# Patient Record
Sex: MALE | Race: WHITE | NOT HISPANIC OR LATINO | URBAN - METROPOLITAN AREA
[De-identification: names, ages, dates, MRNs, and addresses within clinical notes are randomized per-mention and may not be internally consistent; named-entity substitution may affect disease eponyms.]

---

## 2018-08-11 ENCOUNTER — INPATIENT (INPATIENT)
Facility: HOSPITAL | Age: 63
LOS: 2 days | Discharge: HOME | DRG: 854 | End: 2018-08-14
Attending: SURGERY | Admitting: SURGERY
Payer: COMMERCIAL

## 2018-08-11 VITALS
SYSTOLIC BLOOD PRESSURE: 142 MMHG | DIASTOLIC BLOOD PRESSURE: 78 MMHG | RESPIRATION RATE: 18 BRPM | HEART RATE: 109 BPM | OXYGEN SATURATION: 98 % | TEMPERATURE: 99 F

## 2018-08-11 RX ORDER — LIDOCAINE 4 G/100G
1 CREAM TOPICAL ONCE
Qty: 0 | Refills: 0 | Status: COMPLETED | OUTPATIENT
Start: 2018-08-11 | End: 2018-08-12

## 2018-08-11 RX ORDER — SODIUM CHLORIDE 9 MG/ML
1000 INJECTION INTRAMUSCULAR; INTRAVENOUS; SUBCUTANEOUS ONCE
Qty: 0 | Refills: 0 | Status: COMPLETED | OUTPATIENT
Start: 2018-08-11 | End: 2018-08-11

## 2018-08-11 RX ORDER — KETOROLAC TROMETHAMINE 30 MG/ML
15 SYRINGE (ML) INJECTION ONCE
Qty: 0 | Refills: 0 | Status: DISCONTINUED | OUTPATIENT
Start: 2018-08-11 | End: 2018-08-11

## 2018-08-11 NOTE — ED PROVIDER NOTE - CARE PLAN
Principal Discharge DX:	Sepsis  Secondary Diagnosis:	Perianal abscess  Secondary Diagnosis:	Hypokalemia

## 2018-08-11 NOTE — ED PROVIDER NOTE - MEDICAL DECISION MAKING DETAILS
Perianal abscess concern on exam, subsequently with temp 100.3. Of note, met sepsis criteria though not given immediate IV abx due to non-definitive etiology on exam. No thrombosed hemorrhoid. Abdomen benign. Hemodynamically stable. Given Toradol and fluids following which is now comfortable with appearing. Given vanc. D/w surgery who admitted for further w/u and care, especially as pt is on Plavix.

## 2018-08-11 NOTE — ED PROVIDER NOTE - PHYSICAL EXAMINATION
Afebrile, hemodynamically stable, saturating well  Appears uncomfortable  Head NCAT  EOMI grossly, anicteric  MM dry  No JVD  RRR, nml S1/S2, no m/r/g  Lungs CTAB, no w/r/r  Abd soft, NT, ND, nml BS, no rebound or guarding   (below scribe as chaperone): Testicles nontender, no change in color, lie, or elevation  Rectal (scribe chaperone): no visible hemorrhoids. Noted L perianal induration without fluctuance and mild erythema  AAO, CN's 3-12 grossly intact  CLEMENTE spontaneously, no leg cyanosis or edema  Skin warm, dry, no rashes or hives

## 2018-08-11 NOTE — ED PROVIDER NOTE - PMH
BPH (benign prostatic hyperplasia)    Enlarged prostate    HTN (hypertension)    MI (Myocardial Infarction)    Stented Coronary Artery

## 2018-08-11 NOTE — ED PROVIDER NOTE - OBJECTIVE STATEMENT
63 y/o M with a PMHx of MI, BPH, HTN and PSHx of stented coronary artery presents to the ED c/o fever (100) x today urinary retention, abd pain, severe generalized pain and swelling near rectum and overall genitalia. With pain radiating to leg. Pt states he ate raw seafood (which he rarely does) 3 weeks ago, which brought the onset of his symptoms, which have worsened since then. Pt had stomach pain and violent diarrhea that was intermittent and changing in severity x 2 days after eating. Since then pt has gone to bathroom frequency with loose stool. Pt also broke out in hives. Pt denies bloody stool, chest pain, loss of appetite, vomiting, shortness of breath or any other complaint. Pt is currently taking these medications:   atorvastatin (40 mg), Alfuzosin (10), Enalaprin (5mg), clopidogrel (75mg), metprezel (50mg). NKDA. 63 y/o M with a PMHx of MI, BPH, HTN and PSHx of stented coronary artery presents to the ED c/o fever (Tmax 100) x today as well as urinary frequency. Pt states he ate raw seafood (which he rarely does) 3 weeks ago, which brought the onset of his symptoms and which initially resolved intermittently then have worsened since then. Pt had stomach pain and violent diarrhea that was intermittent and changing in severity x 2 days after eating, now with mild b/l suprapubic cramping. Associated mild b/l testicular soreness as well as very painful hemorrhoids. Pt denies bloody stool, chest pain, loss of appetite, vomiting, shortness of breath or any other complaint. Pt is currently taking these medications: atorvastatin (40 mg), Alfuzosin, Enalapril, clopidogrel (75mg). NKDA.

## 2018-08-12 ENCOUNTER — TRANSCRIPTION ENCOUNTER (OUTPATIENT)
Age: 63
End: 2018-08-12

## 2018-08-12 DIAGNOSIS — A41.9 SEPSIS, UNSPECIFIED ORGANISM: ICD-10-CM

## 2018-08-12 DIAGNOSIS — K61.0 ANAL ABSCESS: ICD-10-CM

## 2018-08-12 LAB
ALBUMIN SERPL ELPH-MCNC: 1.5 G/DL — LOW (ref 3.5–5)
ALBUMIN SERPL ELPH-MCNC: 3 G/DL — LOW (ref 3.5–5)
ALP SERPL-CCNC: 27 U/L — LOW (ref 40–120)
ALP SERPL-CCNC: 58 U/L — SIGNIFICANT CHANGE UP (ref 40–120)
ALT FLD-CCNC: 18 U/L DA — SIGNIFICANT CHANGE UP (ref 10–60)
ALT FLD-CCNC: 35 U/L DA — SIGNIFICANT CHANGE UP (ref 10–60)
ANION GAP SERPL CALC-SCNC: 10 MMOL/L — SIGNIFICANT CHANGE UP (ref 5–17)
ANION GAP SERPL CALC-SCNC: 10 MMOL/L — SIGNIFICANT CHANGE UP (ref 5–17)
APPEARANCE UR: CLEAR — SIGNIFICANT CHANGE UP
AST SERPL-CCNC: 12 U/L — SIGNIFICANT CHANGE UP (ref 10–40)
AST SERPL-CCNC: 15 U/L — SIGNIFICANT CHANGE UP (ref 10–40)
BASOPHILS # BLD AUTO: 0.1 K/UL — SIGNIFICANT CHANGE UP (ref 0–0.2)
BASOPHILS # BLD AUTO: 0.1 K/UL — SIGNIFICANT CHANGE UP (ref 0–0.2)
BASOPHILS NFR BLD AUTO: 0.4 % — SIGNIFICANT CHANGE UP (ref 0–2)
BASOPHILS NFR BLD AUTO: 0.6 % — SIGNIFICANT CHANGE UP (ref 0–2)
BILIRUB SERPL-MCNC: 0.3 MG/DL — SIGNIFICANT CHANGE UP (ref 0.2–1.2)
BILIRUB SERPL-MCNC: 0.6 MG/DL — SIGNIFICANT CHANGE UP (ref 0.2–1.2)
BILIRUB UR-MCNC: NEGATIVE — SIGNIFICANT CHANGE UP
BUN SERPL-MCNC: 13 MG/DL — SIGNIFICANT CHANGE UP (ref 7–18)
BUN SERPL-MCNC: 8 MG/DL — SIGNIFICANT CHANGE UP (ref 7–18)
CALCIUM SERPL-MCNC: 8.3 MG/DL — LOW (ref 8.4–10.5)
CALCIUM SERPL-MCNC: <5 MG/DL — CRITICAL LOW (ref 8.4–10.5)
CHLORIDE SERPL-SCNC: 107 MMOL/L — SIGNIFICANT CHANGE UP (ref 96–108)
CHLORIDE SERPL-SCNC: 126 MMOL/L — HIGH (ref 96–108)
CO2 SERPL-SCNC: 15 MMOL/L — LOW (ref 22–31)
CO2 SERPL-SCNC: 24 MMOL/L — SIGNIFICANT CHANGE UP (ref 22–31)
COLOR SPEC: YELLOW — SIGNIFICANT CHANGE UP
CREAT SERPL-MCNC: 0.38 MG/DL — LOW (ref 0.5–1.3)
CREAT SERPL-MCNC: 1.02 MG/DL — SIGNIFICANT CHANGE UP (ref 0.5–1.3)
DIFF PNL FLD: ABNORMAL
EOSINOPHIL # BLD AUTO: 0 K/UL — SIGNIFICANT CHANGE UP (ref 0–0.5)
EOSINOPHIL # BLD AUTO: 0 K/UL — SIGNIFICANT CHANGE UP (ref 0–0.5)
EOSINOPHIL NFR BLD AUTO: 0 % — SIGNIFICANT CHANGE UP (ref 0–6)
EOSINOPHIL NFR BLD AUTO: 0 % — SIGNIFICANT CHANGE UP (ref 0–6)
GLUCOSE SERPL-MCNC: 134 MG/DL — HIGH (ref 70–99)
GLUCOSE SERPL-MCNC: 75 MG/DL — SIGNIFICANT CHANGE UP (ref 70–99)
GLUCOSE UR QL: NEGATIVE — SIGNIFICANT CHANGE UP
HCT VFR BLD CALC: 33.5 % — LOW (ref 39–50)
HCT VFR BLD CALC: 34.2 % — LOW (ref 39–50)
HGB BLD-MCNC: 11.6 G/DL — LOW (ref 13–17)
HGB BLD-MCNC: 11.7 G/DL — LOW (ref 13–17)
KETONES UR-MCNC: NEGATIVE — SIGNIFICANT CHANGE UP
LACTATE SERPL-SCNC: 0.7 MMOL/L — SIGNIFICANT CHANGE UP (ref 0.7–2)
LEUKOCYTE ESTERASE UR-ACNC: NEGATIVE — SIGNIFICANT CHANGE UP
LIDOCAIN IGE QN: 47 U/L — LOW (ref 73–393)
LYMPHOCYTES # BLD AUTO: 0.7 K/UL — LOW (ref 1–3.3)
LYMPHOCYTES # BLD AUTO: 0.7 K/UL — LOW (ref 1–3.3)
LYMPHOCYTES # BLD AUTO: 5.6 % — LOW (ref 13–44)
LYMPHOCYTES # BLD AUTO: 5.6 % — LOW (ref 13–44)
MCHC RBC-ENTMCNC: 29.8 PG — SIGNIFICANT CHANGE UP (ref 27–34)
MCHC RBC-ENTMCNC: 29.8 PG — SIGNIFICANT CHANGE UP (ref 27–34)
MCHC RBC-ENTMCNC: 34.2 GM/DL — SIGNIFICANT CHANGE UP (ref 32–36)
MCHC RBC-ENTMCNC: 34.5 GM/DL — SIGNIFICANT CHANGE UP (ref 32–36)
MCV RBC AUTO: 86.4 FL — SIGNIFICANT CHANGE UP (ref 80–100)
MCV RBC AUTO: 87 FL — SIGNIFICANT CHANGE UP (ref 80–100)
MONOCYTES # BLD AUTO: 1 K/UL — HIGH (ref 0–0.9)
MONOCYTES # BLD AUTO: 1.1 K/UL — HIGH (ref 0–0.9)
MONOCYTES NFR BLD AUTO: 7.3 % — SIGNIFICANT CHANGE UP (ref 2–14)
MONOCYTES NFR BLD AUTO: 8 % — SIGNIFICANT CHANGE UP (ref 2–14)
NEUTROPHILS # BLD AUTO: 11.3 K/UL — HIGH (ref 1.8–7.4)
NEUTROPHILS # BLD AUTO: 11.4 K/UL — HIGH (ref 1.8–7.4)
NEUTROPHILS NFR BLD AUTO: 85.8 % — HIGH (ref 43–77)
NEUTROPHILS NFR BLD AUTO: 86.7 % — HIGH (ref 43–77)
NITRITE UR-MCNC: NEGATIVE — SIGNIFICANT CHANGE UP
PH UR: 5 — SIGNIFICANT CHANGE UP (ref 5–8)
PLATELET # BLD AUTO: 181 K/UL — SIGNIFICANT CHANGE UP (ref 150–400)
PLATELET # BLD AUTO: 194 K/UL — SIGNIFICANT CHANGE UP (ref 150–400)
POTASSIUM SERPL-MCNC: 1.8 MMOL/L — CRITICAL LOW (ref 3.5–5.3)
POTASSIUM SERPL-MCNC: 3.1 MMOL/L — LOW (ref 3.5–5.3)
POTASSIUM SERPL-SCNC: 1.8 MMOL/L — CRITICAL LOW (ref 3.5–5.3)
POTASSIUM SERPL-SCNC: 3.1 MMOL/L — LOW (ref 3.5–5.3)
PROT SERPL-MCNC: 3.6 G/DL — LOW (ref 6–8.3)
PROT SERPL-MCNC: 7 G/DL — SIGNIFICANT CHANGE UP (ref 6–8.3)
PROT UR-MCNC: 15
RBC # BLD: 3.88 M/UL — LOW (ref 4.2–5.8)
RBC # BLD: 3.93 M/UL — LOW (ref 4.2–5.8)
RBC # FLD: 10.8 % — SIGNIFICANT CHANGE UP (ref 10.3–14.5)
RBC # FLD: 10.8 % — SIGNIFICANT CHANGE UP (ref 10.3–14.5)
SODIUM SERPL-SCNC: 141 MMOL/L — SIGNIFICANT CHANGE UP (ref 135–145)
SODIUM SERPL-SCNC: 151 MMOL/L — HIGH (ref 135–145)
SP GR SPEC: 1.01 — SIGNIFICANT CHANGE UP (ref 1.01–1.02)
UROBILINOGEN FLD QL: 4
WBC # BLD: 13 K/UL — HIGH (ref 3.8–10.5)
WBC # BLD: 13.3 K/UL — HIGH (ref 3.8–10.5)
WBC # FLD AUTO: 13 K/UL — HIGH (ref 3.8–10.5)
WBC # FLD AUTO: 13.3 K/UL — HIGH (ref 3.8–10.5)

## 2018-08-12 PROCEDURE — 99291 CRITICAL CARE FIRST HOUR: CPT

## 2018-08-12 PROCEDURE — 74177 CT ABD & PELVIS W/CONTRAST: CPT | Mod: 26

## 2018-08-12 PROCEDURE — 99222 1ST HOSP IP/OBS MODERATE 55: CPT | Mod: AI,57

## 2018-08-12 RX ORDER — AMPICILLIN SODIUM AND SULBACTAM SODIUM 250; 125 MG/ML; MG/ML
1.5 INJECTION, POWDER, FOR SUSPENSION INTRAMUSCULAR; INTRAVENOUS EVERY 6 HOURS
Qty: 0 | Refills: 0 | Status: DISCONTINUED | OUTPATIENT
Start: 2018-08-12 | End: 2018-08-14

## 2018-08-12 RX ORDER — SODIUM CHLORIDE 9 MG/ML
1000 INJECTION INTRAMUSCULAR; INTRAVENOUS; SUBCUTANEOUS ONCE
Qty: 0 | Refills: 0 | Status: COMPLETED | OUTPATIENT
Start: 2018-08-12 | End: 2018-08-12

## 2018-08-12 RX ORDER — ATORVASTATIN CALCIUM 80 MG/1
40 TABLET, FILM COATED ORAL AT BEDTIME
Qty: 0 | Refills: 0 | Status: DISCONTINUED | OUTPATIENT
Start: 2018-08-12 | End: 2018-08-14

## 2018-08-12 RX ORDER — METOPROLOL TARTRATE 50 MG
50 TABLET ORAL DAILY
Qty: 0 | Refills: 0 | Status: DISCONTINUED | OUTPATIENT
Start: 2018-08-12 | End: 2018-08-14

## 2018-08-12 RX ORDER — VANCOMYCIN HCL 1 G
1000 VIAL (EA) INTRAVENOUS ONCE
Qty: 0 | Refills: 0 | Status: COMPLETED | OUTPATIENT
Start: 2018-08-12 | End: 2018-08-12

## 2018-08-12 RX ORDER — POTASSIUM CHLORIDE 20 MEQ
40 PACKET (EA) ORAL ONCE
Qty: 0 | Refills: 0 | Status: COMPLETED | OUTPATIENT
Start: 2018-08-12 | End: 2018-08-12

## 2018-08-12 RX ORDER — MORPHINE SULFATE 50 MG/1
2 CAPSULE, EXTENDED RELEASE ORAL EVERY 4 HOURS
Qty: 0 | Refills: 0 | Status: DISCONTINUED | OUTPATIENT
Start: 2018-08-12 | End: 2018-08-14

## 2018-08-12 RX ORDER — DEXTROSE MONOHYDRATE, SODIUM CHLORIDE, AND POTASSIUM CHLORIDE 50; .745; 4.5 G/1000ML; G/1000ML; G/1000ML
1000 INJECTION, SOLUTION INTRAVENOUS
Qty: 0 | Refills: 0 | Status: DISCONTINUED | OUTPATIENT
Start: 2018-08-12 | End: 2018-08-14

## 2018-08-12 RX ORDER — TAMSULOSIN HYDROCHLORIDE 0.4 MG/1
0.4 CAPSULE ORAL AT BEDTIME
Qty: 0 | Refills: 0 | Status: DISCONTINUED | OUTPATIENT
Start: 2018-08-12 | End: 2018-08-14

## 2018-08-12 RX ORDER — ASPIRIN/CALCIUM CARB/MAGNESIUM 324 MG
81 TABLET ORAL DAILY
Qty: 0 | Refills: 0 | Status: DISCONTINUED | OUTPATIENT
Start: 2018-08-12 | End: 2018-08-14

## 2018-08-12 RX ORDER — ENOXAPARIN SODIUM 100 MG/ML
40 INJECTION SUBCUTANEOUS DAILY
Qty: 0 | Refills: 0 | Status: DISCONTINUED | OUTPATIENT
Start: 2018-08-12 | End: 2018-08-14

## 2018-08-12 RX ORDER — DOCUSATE SODIUM 100 MG
100 CAPSULE ORAL THREE TIMES A DAY
Qty: 0 | Refills: 0 | Status: DISCONTINUED | OUTPATIENT
Start: 2018-08-12 | End: 2018-08-12

## 2018-08-12 RX ORDER — ACETAMINOPHEN 500 MG
1000 TABLET ORAL ONCE
Qty: 0 | Refills: 0 | Status: COMPLETED | OUTPATIENT
Start: 2018-08-12 | End: 2018-08-12

## 2018-08-12 RX ORDER — ONDANSETRON 8 MG/1
4 TABLET, FILM COATED ORAL EVERY 6 HOURS
Qty: 0 | Refills: 0 | Status: DISCONTINUED | OUTPATIENT
Start: 2018-08-12 | End: 2018-08-14

## 2018-08-12 RX ORDER — ACETAMINOPHEN 500 MG
975 TABLET ORAL ONCE
Qty: 0 | Refills: 0 | Status: COMPLETED | OUTPATIENT
Start: 2018-08-12 | End: 2018-08-12

## 2018-08-12 RX ADMIN — ENOXAPARIN SODIUM 40 MILLIGRAM(S): 100 INJECTION SUBCUTANEOUS at 13:33

## 2018-08-12 RX ADMIN — ATORVASTATIN CALCIUM 40 MILLIGRAM(S): 80 TABLET, FILM COATED ORAL at 23:42

## 2018-08-12 RX ADMIN — Medication 975 MILLIGRAM(S): at 01:56

## 2018-08-12 RX ADMIN — Medication 250 MILLIGRAM(S): at 06:26

## 2018-08-12 RX ADMIN — AMPICILLIN SODIUM AND SULBACTAM SODIUM 100 GRAM(S): 250; 125 INJECTION, POWDER, FOR SUSPENSION INTRAMUSCULAR; INTRAVENOUS at 13:33

## 2018-08-12 RX ADMIN — SODIUM CHLORIDE 2000 MILLILITER(S): 9 INJECTION INTRAMUSCULAR; INTRAVENOUS; SUBCUTANEOUS at 02:13

## 2018-08-12 RX ADMIN — DEXTROSE MONOHYDRATE, SODIUM CHLORIDE, AND POTASSIUM CHLORIDE 125 MILLILITER(S): 50; .745; 4.5 INJECTION, SOLUTION INTRAVENOUS at 13:33

## 2018-08-12 RX ADMIN — SODIUM CHLORIDE 2000 MILLILITER(S): 9 INJECTION INTRAMUSCULAR; INTRAVENOUS; SUBCUTANEOUS at 00:16

## 2018-08-12 RX ADMIN — AMPICILLIN SODIUM AND SULBACTAM SODIUM 100 GRAM(S): 250; 125 INJECTION, POWDER, FOR SUSPENSION INTRAMUSCULAR; INTRAVENOUS at 19:03

## 2018-08-12 RX ADMIN — Medication 50 MILLIGRAM(S): at 13:33

## 2018-08-12 RX ADMIN — Medication 40 MILLIEQUIVALENT(S): at 04:00

## 2018-08-12 RX ADMIN — AMPICILLIN SODIUM AND SULBACTAM SODIUM 100 GRAM(S): 250; 125 INJECTION, POWDER, FOR SUSPENSION INTRAMUSCULAR; INTRAVENOUS at 06:26

## 2018-08-12 RX ADMIN — Medication 81 MILLIGRAM(S): at 13:33

## 2018-08-12 RX ADMIN — TAMSULOSIN HYDROCHLORIDE 0.4 MILLIGRAM(S): 0.4 CAPSULE ORAL at 23:42

## 2018-08-12 RX ADMIN — Medication 975 MILLIGRAM(S): at 02:43

## 2018-08-12 RX ADMIN — LIDOCAINE 1 APPLICATION(S): 4 CREAM TOPICAL at 23:42

## 2018-08-12 NOTE — H&P ADULT - ASSESSMENT
61 y/o male with sig cardiac history as per HPI; here with perianal absecss 63 y/o male with significant cardiac history as per HPI; here with perianal absecss

## 2018-08-12 NOTE — H&P ADULT - NSHPPHYSICALEXAM_GEN_ALL_CORE
NAD  alert, oriented x3  S1S2  pul good air entry b/l  abd soft, NT/ND  rectal- tender perirectal 9oclock with fluctuance, no active drainge, int exam deferred  ext no c/c/e NAD  alert, oriented x3  S1S2  pul good air entry b/l  abd soft, NT/ND  rectal- tender perirectal 9oclock with fluctuance, no active drainage, int exam deferred  ext no c/c/e

## 2018-08-12 NOTE — H&P ADULT - HISTORY OF PRESENT ILLNESS
63 y/o male with h/o BPH, HTN, MI 20 yrs ago with cardiac stenting possibly x8 per pt during past 20 yrs  currently on asa/plavix/statin/bblockers  pt unsure of medication dosages, will ask wife to bring meds  pt comes with c/o rectal pain for about 8 days with some chills, no f/n/v  pt ate some seafood about 3 weeks ago with resulting diarrhea, hives    < from: CT Abdomen and Pelvis w/ IV Cont (08.12.18 @ 03:30) >  IMPRESSION: 4.0 x 2.4 cm left perianal abscess.    < end of copied text >

## 2018-08-12 NOTE — H&P ADULT - PROBLEM SELECTOR PLAN 1
npo, hydrate, pain control, iv abx, med consult/clearance, pre-op for I&D 8/13 diet today, hydrate, pain control, iv abx, med consult/clearance, pre-op for I&D 8/13

## 2018-08-12 NOTE — CONSULT NOTE ADULT - ASSESSMENT
63 y/o male with h/o BPH, HTN, and MI in 1998 (s/p cardiac stents) came in with complaints of perianal pain x 8 days with fever off and on. He is found to have a perianal abcess requiring I&D in OR.  Medicine is consulted for pre-op clearance.    1. Perianal Abscess:  Plan for OR tomorrow 8/11/18 for I&D  METS>4 (Patient is able to climb 4 flight of stairs and walk ~2 miles without SOB or fatigue, He is independent in his daily living and is able to lift heavy furniture. He works as a )  RCRI- 0.9% risk of major cardiac events  MARTINES score: 0.2% risk of Myocardial infarction or cardiac arrest intraoperatively of upto 30 days post-op  EKG- Sinus rythm with PVC, no prior EKG to compare with  No current chest pain or SOB  - Will obtain ECHO  - Patient is at MODERATE RISK for OR    2. CAD:  - c/w ASA 81mg qday  - c/w atorvastatin 40mg qday  - c/w metoprolol 50mg qday with parameters  - Hold off to plavix for now due to surgery tomorrow    3. BPH:  - c/w alfuzosin 10mg qday 63 y/o male with h/o BPH, HTN, and MI in 1998 (s/p cardiac stents) came in with complaints of perianal pain x 8 days with fever off and on. He is found to have a perianal abcess requiring I&D in OR.  Medicine is consulted for pre-op clearance.    1. Perianal Abscess:  Plan for OR tomorrow 8/11/18 for I&D  METS>4 (Patient is able to climb 4 flight of stairs and walk ~2 miles without SOB or fatigue, He is independent in his daily living and is able to lift heavy furniture. He works as a )  RCRI- 0.9% risk of major cardiac events  MARTINES score: 0.2% risk of Myocardial infarction or cardiac arrest intraoperatively of upto 30 days post-op  EKG- Sinus rythm with PVC, no prior EKG to compare with  No current chest pain or SOB  - Will obtain ECHO  - Patient is at MODERATE RISK for OR    2. CAD:  - c/w home dose ASA 81mg qday  - c/w home dose atorvastatin 40mg qday  - c/w home dose metoprolol 50mg qday with parameters  - Hold off to plavix for now due to surgery tomorrow    3. BPH:  - c/w alfuzosin 10mg qday with blood pressure parameters    4. DVT prophylaxis:  [] Previous VTE                                                3  [] Thrombophilia                                             2  [] Lower limb paralysis                                   2    [] Current Cancer                                             2   [X] Immobilization > 24 hrs                              1  [] ICU/CCU stay > 24 hours                             1  [X] Age > 60                                                         1    IMPROVE VTE Score: 2  c/w lovenox 40mg qday

## 2018-08-12 NOTE — PROGRESS NOTE ADULT - SUBJECTIVE AND OBJECTIVE BOX
Pt seen and examined at bedside in mild distress secondary to anal pain. Pt admitted for a perianal abscess with plan to incise and drain in the OR. Pt has significant medical history of multiple stent placement on ASA and Plavix. Denies fever, nausea, vomiting. Reports diarrhea.    PE:  Vitals:  Vital Signs Last 24 Hrs  T(C): 36.7 (12 Aug 2018 08:47), Max: 37.9 (12 Aug 2018 01:32)  T(F): 98 (12 Aug 2018 08:47), Max: 100.3 (12 Aug 2018 01:32)  HR: 75 (12 Aug 2018 08:47) (75 - 110)  BP: 135/81 (12 Aug 2018 08:47) (115/54 - 153/58)  BP(mean): --  RR: 20 (12 Aug 2018 08:47) (16 - 20)  SpO2: 98% (12 Aug 2018 08:47) (96% - 98%)  Gen: AOx3 mild distress    Labs:                        11.7   13.0  )-----------( 194      ( 12 Aug 2018 01:57 )             34.2   08-12    141  |  107  |  13  ----------------------------<  134<H>  3.1<L>   |  24  |  1.02    Ca    8.3<L>      12 Aug 2018 01:57    TPro  7.0  /  Alb  3.0<L>  /  TBili  0.6  /  DBili  x   /  AST  15  /  ALT  35  /  AlkPhos  58  08-12    A/P:    62 M with perianal abscess with significant cardiac history of asa and Plavix   - Medicine consult for clearance  - Diet  - Pre-op for OR 8/13 Pt seen and examined at bedside in mild distress secondary to anal pain. Pt admitted for a perianal abscess with plan to incise and drain in the OR. Pt has significant medical history of multiple stent placement on ASA and Plavix. Denies fever, nausea, vomiting. Reports diarrhea.  He is awaiting input from medical team for preoperative optimization.    PE:  Vitals:  Vital Signs Last 24 Hrs  T(C): 36.7 (12 Aug 2018 08:47), Max: 37.9 (12 Aug 2018 01:32)  T(F): 98 (12 Aug 2018 08:47), Max: 100.3 (12 Aug 2018 01:32)  HR: 75 (12 Aug 2018 08:47) (75 - 110)  BP: 135/81 (12 Aug 2018 08:47) (115/54 - 153/58)  BP(mean): --  RR: 20 (12 Aug 2018 08:47) (16 - 20)  SpO2: 98% (12 Aug 2018 08:47) (96% - 98%)    Gen: AOx3 mild distress  Rectal: palpable firm area on left lateral aspect adjacent to anus, no overlying skin changes  Ext no edema  psych affect appropriate  Heent no icterus  Derm no jaundice      Labs:                        11.7   13.0  )-----------( 194      ( 12 Aug 2018 01:57 )             34.2   08-12    141  |  107  |  13  ----------------------------<  134<H>  3.1<L>   |  24  |  1.02    Ca    8.3<L>      12 Aug 2018 01:57    TPro  7.0  /  Alb  3.0<L>  /  TBili  0.6  /  DBili  x   /  AST  15  /  ALT  35  /  AlkPhos  58  08-12    A/P:    62 M with perianal abscess with significant cardiac history of asa and Plavix   - Medicine consult for clearance  - Diet  - Pre-op for OR 8/13

## 2018-08-12 NOTE — H&P ADULT - ATTENDING COMMENTS
Agree with above. 4cm perianal abscess palpable externally. Patient would not tolerate bedside I&D so plan for I&D in OR  Exam c/w CT scan - fluctuance/pain at left lateral aspect of anus  Significant cardiac history with multiple cardiac stents, on asa/plavix  Medicine consulted for preop optimization  Plan for OR tomorrow for I&D

## 2018-08-12 NOTE — ED ADULT NURSE NOTE - NSIMPLEMENTINTERV_GEN_ALL_ED
Implemented All Universal Safety Interventions:  [place] to call system. Call bell, personal items and telephone within reach. Instruct patient to call for assistance. Room bathroom lighting operational. Non-slip footwear when patient is off stretcher. Physically safe environment: no spills, clutter or unnecessary equipment. Stretcher in lowest position, wheels locked, appropriate side rails in place.

## 2018-08-12 NOTE — CONSULT NOTE ADULT - SUBJECTIVE AND OBJECTIVE BOX
HPI:  61 y/o male with h/o BPH, HTN, and MI in  (s/p cardiac stents) came in with complaints of perianal pain x 8 days with fever off and on. Patient states that since  he had angiography 3 times (year , , and ) and has multiple stents placed. He has been on Aspirin and Plavix since 20 years and is compliant with his medications. He denies chest pain, SOB, nausea, vomiting, or leg swelling. Denies dyspnea or orthopnea. His last stress test was 2-3 years ago which was normal. He does not recall having an ECHO in the past. No other complaints at this time.    PAST MEDICAL & SURGICAL HISTORY:  HTN (hypertension)  BPH (benign prostatic hyperplasia)  Enlarged prostate  MI (Myocardial Infarction)  Stented Coronary Artery  No significant past surgical history      No Known Allergies      ampicillin/sulbactam  IVPB 1.5 Gram(s) IV Intermittent every 6 hours  dextrose 5% + sodium chloride 0.45% with potassium chloride 20 mEq/L 1000 milliLiter(s) IV Continuous <Continuous>  docusate sodium 100 milliGRAM(s) Oral three times a day  enalapril 5 milliGRAM(s) Oral daily  lidocaine 2% Gel 1 Application(s) Topical Once PRN  morphine  - Injectable 2 milliGRAM(s) IV Push every 4 hours PRN  ondansetron Injectable 4 milliGRAM(s) IV Push every 6 hours PRN        REVIEW OF SYSTEMS:  CONSTITUTIONAL: No weakness  EYES/ENT: No visual changes;  No vertigo or throat pain   NECK: No pain or stiffness  RESPIRATORY: No cough, wheezing, hemoptysis; No shortness of breath  CARDIOVASCULAR: No chest pain or palpitations  GASTROINTESTINAL: No abdominal or epigastric pain. No nausea, vomiting, or hematemesis; No diarrhea or constipation. No melena or hematochezia.  GENITOURINARY: No dysuria, frequency or hematuria  NEUROLOGICAL: No numbness or weakness  SKIN: No itching, burning, rashes, or lesions   All other review of systems is negative unless indicated above.  	  PHYSICAL EXAM:    Vital Signs Last 24 Hrs  T(C): 36.7 (12 Aug 2018 08:47), Max: 37.9 (12 Aug 2018 01:32)  T(F): 98 (12 Aug 2018 08:47), Max: 100.3 (12 Aug 2018 01:32)  HR: 75 (12 Aug 2018 08:47) (75 - 110)  BP: 135/81 (12 Aug 2018 08:47) (115/54 - 153/58)  BP(mean): --  RR: 20 (12 Aug 2018 08:47) (16 - 20)  SpO2: 98% (12 Aug 2018 08:47) (96% - 98%)    GENERAL: NAD  HEAD:  Atraumatic, Normocephalic  EYES: EOMI, PERRLA, conjunctiva and sclera clear  ENMT: Moist mucous membranes  NECK: Supple  NERVOUS SYSTEM:  Alert & Oriented X3  CHEST/LUNG: Clear to auscultation bilaterally; No rales, rhonchi, wheezing, or rubs  HEART: Regular rate and rhythm; No murmurs, rubs, or gallops  ABDOMEN: Soft, Nontender, Nondistended; Bowel sounds present  EXTREMITIES:  2+ Peripheral Pulses      LABS/DIAGNOSTIC TESTS                            11.7   13.0  )-----------( 194      ( 12 Aug 2018 01:57 )             34.2       WBC Count: 13.0 K/uL ( @ 01:57)  WBC Count: 13.3 K/uL ( @ 00:58)          141  |  107  |  13  ----------------------------<  134<H>  3.1<L>   |  24  |  1.02    Ca    8.3<L>      12 Aug 2018 01:57    TPro  7.0  /  Alb  3.0<L>  /  TBili  0.6  /  DBili  x   /  AST  15  /  ALT  35  /  AlkPhos  58        Urinalysis Basic - ( 12 Aug 2018 00:58 )    Color: Yellow / Appearance: Clear / S.015 / pH: x  Gluc: x / Ketone: Negative  / Bili: Negative / Urobili: 4   Blood: x / Protein: 15 / Nitrite: Negative   Leuk Esterase: Negative / RBC: 0-2 /HPF / WBC 0-2 /HPF   Sq Epi: x / Non Sq Epi: Occasional /HPF / Bacteria: Trace /HPF            LACTATE:Lactate, Blood: 0.7 mmol/L ( @ 01:30)        < from: CT Abdomen and Pelvis w/ IV Cont (18 @ 03:30) >    EXAM:  CT ABDOMEN AND PELVIS IC                            PROCEDURE DATE:  2018          INTERPRETATION:  CLINICAL INFORMATION: Abdominal and perirectal pain    COMPARISON: None    PROCEDURE:   CT of the Abdomen and Pelvis was performed withintravenous contrast.   Intravenous contrast: 90 ml Omnipaque 350. 10 ml discarded.  Oral contrast: None.  Sagittal and coronal reformats were performed.    FINDINGS:    LOWER CHEST: Within normal limits.    LIVER: Within normal limits.  BILE DUCTS: Normal caliber.  GALLBLADDER: Within normal limits.  SPLEEN: Within normal limits.  PANCREAS: Within normal limits.  ADRENALS: Within normal limits.  KIDNEYS/URETERS: 5.6 cm left parapelvic renal cyst..    BLADDER: Prostate gland indents upon the baseof the urinary bladder.  REPRODUCTIVE ORGANS: The prostate is enlarged. Trace bilateral scrotal   hydroceles.    BOWEL: No bowel obstruction. Appendix normal.  PERITONEUM: No ascites.  VESSELS:  Within normal limits.  RETROPERITONEUM: No lymphadenopathy.    ABDOMINAL WALL: Left-sided perianal abscess measures are 4.0 x 2.4 x 2.0  BONES: Degenerative changes of the spine.    IMPRESSION: 4.0 x 2.4 cm left perianal abscess.          < end of copied text >  < from: CT Abdomen and Pelvis w/ IV Cont (18 @ 03:30) >    EXAM:  CT ABDOMEN AND PELVIS IC                            PROCEDURE DATE:  2018          INTERPRETATION:  CLINICAL INFORMATION: Abdominal and perirectal pain    COMPARISON: None    PROCEDURE:   CT of the Abdomen and Pelvis was performed withintravenous contrast.   Intravenous contrast: 90 ml Omnipaque 350. 10 ml discarded.  Oral contrast: None.  Sagittal and coronal reformats were performed.    FINDINGS:    LOWER CHEST: Within normal limits.    LIVER: Within normal limits.  BILE DUCTS: Normal caliber.  GALLBLADDER: Within normal limits.  SPLEEN: Within normal limits.  PANCREAS: Within normal limits.  ADRENALS: Within normal limits.  KIDNEYS/URETERS: 5.6 cm left parapelvic renal cyst..    BLADDER: Prostate gland indents upon the baseof the urinary bladder.  REPRODUCTIVE ORGANS: The prostate is enlarged. Trace bilateral scrotal   hydroceles.    BOWEL: No bowel obstruction. Appendix normal.  PERITONEUM: No ascites.  VESSELS:  Within normal limits.  RETROPERITONEUM: No lymphadenopathy.    ABDOMINAL WALL: Left-sided perianal abscess measures are 4.0 x 2.4 x 2.0  BONES: Degenerative changes of the spine.    IMPRESSION: 4.0 x 2.4 cm left perianal abscess.          < end of copied text >

## 2018-08-12 NOTE — ED ADULT NURSE NOTE - ED STAT RN HANDOFF DETAILS
endorsed to PATRICIA Hunter on [address] [place] room 603 in stable condition for continuation of care. pt a&ox3, ambulatory. admitted for perianal abscess and hypokalemia. 20G RAC.

## 2018-08-12 NOTE — ED ADULT NURSE NOTE - OBJECTIVE STATEMENT
patient stated he ate raw seafood 3 weeks ago and developed stomach discomfort with diarrhea for 5-7 days and improved, then developed hives every 1 to 2 days, went to PCP because of loose stool and was sent by PCP to ER for CT bc of suspected abscess by rectal area.

## 2018-08-13 DIAGNOSIS — I10 ESSENTIAL (PRIMARY) HYPERTENSION: ICD-10-CM

## 2018-08-13 DIAGNOSIS — N40.0 BENIGN PROSTATIC HYPERPLASIA WITHOUT LOWER URINARY TRACT SYMPTOMS: ICD-10-CM

## 2018-08-13 DIAGNOSIS — I27.20 PULMONARY HYPERTENSION, UNSPECIFIED: ICD-10-CM

## 2018-08-13 DIAGNOSIS — Z29.9 ENCOUNTER FOR PROPHYLACTIC MEASURES, UNSPECIFIED: ICD-10-CM

## 2018-08-13 DIAGNOSIS — I25.10 ATHEROSCLEROTIC HEART DISEASE OF NATIVE CORONARY ARTERY WITHOUT ANGINA PECTORIS: ICD-10-CM

## 2018-08-13 LAB
ANION GAP SERPL CALC-SCNC: 7 MMOL/L — SIGNIFICANT CHANGE UP (ref 5–17)
APTT BLD: 46.4 SEC — HIGH (ref 27.5–37.4)
BASOPHILS # BLD AUTO: 0 K/UL — SIGNIFICANT CHANGE UP (ref 0–0.2)
BASOPHILS NFR BLD AUTO: 0.4 % — SIGNIFICANT CHANGE UP (ref 0–2)
BUN SERPL-MCNC: 9 MG/DL — SIGNIFICANT CHANGE UP (ref 7–18)
CALCIUM SERPL-MCNC: 9 MG/DL — SIGNIFICANT CHANGE UP (ref 8.4–10.5)
CHLORIDE SERPL-SCNC: 105 MMOL/L — SIGNIFICANT CHANGE UP (ref 96–108)
CO2 SERPL-SCNC: 27 MMOL/L — SIGNIFICANT CHANGE UP (ref 22–31)
CREAT SERPL-MCNC: 0.99 MG/DL — SIGNIFICANT CHANGE UP (ref 0.5–1.3)
EOSINOPHIL # BLD AUTO: 0 K/UL — SIGNIFICANT CHANGE UP (ref 0–0.5)
EOSINOPHIL NFR BLD AUTO: 0.2 % — SIGNIFICANT CHANGE UP (ref 0–6)
GLUCOSE SERPL-MCNC: 113 MG/DL — HIGH (ref 70–99)
HCT VFR BLD CALC: 39.6 % — SIGNIFICANT CHANGE UP (ref 39–50)
HGB BLD-MCNC: 13.4 G/DL — SIGNIFICANT CHANGE UP (ref 13–17)
INR BLD: 1.32 RATIO — HIGH (ref 0.88–1.16)
LYMPHOCYTES # BLD AUTO: 0.6 K/UL — LOW (ref 1–3.3)
LYMPHOCYTES # BLD AUTO: 6.1 % — LOW (ref 13–44)
MCHC RBC-ENTMCNC: 29.7 PG — SIGNIFICANT CHANGE UP (ref 27–34)
MCHC RBC-ENTMCNC: 33.7 GM/DL — SIGNIFICANT CHANGE UP (ref 32–36)
MCV RBC AUTO: 88 FL — SIGNIFICANT CHANGE UP (ref 80–100)
MONOCYTES # BLD AUTO: 0.8 K/UL — SIGNIFICANT CHANGE UP (ref 0–0.9)
MONOCYTES NFR BLD AUTO: 8.2 % — SIGNIFICANT CHANGE UP (ref 2–14)
NEUTROPHILS # BLD AUTO: 8.4 K/UL — HIGH (ref 1.8–7.4)
NEUTROPHILS NFR BLD AUTO: 85 % — HIGH (ref 43–77)
PLATELET # BLD AUTO: 208 K/UL — SIGNIFICANT CHANGE UP (ref 150–400)
POTASSIUM SERPL-MCNC: 3.3 MMOL/L — LOW (ref 3.5–5.3)
POTASSIUM SERPL-SCNC: 3.3 MMOL/L — LOW (ref 3.5–5.3)
PROTHROM AB SERPL-ACNC: 14.5 SEC — HIGH (ref 9.8–12.7)
RBC # BLD: 4.5 M/UL — SIGNIFICANT CHANGE UP (ref 4.2–5.8)
RBC # FLD: 11.2 % — SIGNIFICANT CHANGE UP (ref 10.3–14.5)
SODIUM SERPL-SCNC: 139 MMOL/L — SIGNIFICANT CHANGE UP (ref 135–145)
WBC # BLD: 9.8 K/UL — SIGNIFICANT CHANGE UP (ref 3.8–10.5)
WBC # FLD AUTO: 9.8 K/UL — SIGNIFICANT CHANGE UP (ref 3.8–10.5)

## 2018-08-13 PROCEDURE — 46050 I&D PERIANAL ABSCESS SUPFC: CPT | Mod: GC

## 2018-08-13 RX ORDER — ACETAMINOPHEN 500 MG
1000 TABLET ORAL ONCE
Qty: 0 | Refills: 0 | Status: COMPLETED | OUTPATIENT
Start: 2018-08-13 | End: 2018-08-13

## 2018-08-13 RX ORDER — ONDANSETRON 8 MG/1
4 TABLET, FILM COATED ORAL ONCE
Qty: 0 | Refills: 0 | Status: DISCONTINUED | OUTPATIENT
Start: 2018-08-13 | End: 2018-08-13

## 2018-08-13 RX ORDER — FENTANYL CITRATE 50 UG/ML
25 INJECTION INTRAVENOUS
Qty: 0 | Refills: 0 | Status: DISCONTINUED | OUTPATIENT
Start: 2018-08-13 | End: 2018-08-13

## 2018-08-13 RX ORDER — POTASSIUM CHLORIDE 20 MEQ
10 PACKET (EA) ORAL
Qty: 0 | Refills: 0 | Status: COMPLETED | OUTPATIENT
Start: 2018-08-13 | End: 2018-08-13

## 2018-08-13 RX ORDER — SODIUM CHLORIDE 9 MG/ML
1000 INJECTION, SOLUTION INTRAVENOUS
Qty: 0 | Refills: 0 | Status: DISCONTINUED | OUTPATIENT
Start: 2018-08-13 | End: 2018-08-13

## 2018-08-13 RX ORDER — LIDOCAINE 4 G/100G
1 CREAM TOPICAL ONCE
Qty: 0 | Refills: 0 | Status: COMPLETED | OUTPATIENT
Start: 2018-08-13 | End: 2018-08-13

## 2018-08-13 RX ADMIN — AMPICILLIN SODIUM AND SULBACTAM SODIUM 100 GRAM(S): 250; 125 INJECTION, POWDER, FOR SUSPENSION INTRAMUSCULAR; INTRAVENOUS at 12:20

## 2018-08-13 RX ADMIN — ENOXAPARIN SODIUM 40 MILLIGRAM(S): 100 INJECTION SUBCUTANEOUS at 12:27

## 2018-08-13 RX ADMIN — Medication 400 MILLIGRAM(S): at 18:30

## 2018-08-13 RX ADMIN — Medication 5 MILLIGRAM(S): at 06:35

## 2018-08-13 RX ADMIN — AMPICILLIN SODIUM AND SULBACTAM SODIUM 100 GRAM(S): 250; 125 INJECTION, POWDER, FOR SUSPENSION INTRAMUSCULAR; INTRAVENOUS at 21:41

## 2018-08-13 RX ADMIN — Medication 50 MILLIGRAM(S): at 06:35

## 2018-08-13 RX ADMIN — Medication 100 MILLIEQUIVALENT(S): at 10:01

## 2018-08-13 RX ADMIN — Medication 81 MILLIGRAM(S): at 12:20

## 2018-08-13 RX ADMIN — ATORVASTATIN CALCIUM 40 MILLIGRAM(S): 80 TABLET, FILM COATED ORAL at 21:41

## 2018-08-13 RX ADMIN — LIDOCAINE 1 APPLICATION(S): 4 CREAM TOPICAL at 06:34

## 2018-08-13 RX ADMIN — Medication 400 MILLIGRAM(S): at 00:03

## 2018-08-13 RX ADMIN — Medication 1000 MILLIGRAM(S): at 18:30

## 2018-08-13 RX ADMIN — TAMSULOSIN HYDROCHLORIDE 0.4 MILLIGRAM(S): 0.4 CAPSULE ORAL at 21:42

## 2018-08-13 RX ADMIN — AMPICILLIN SODIUM AND SULBACTAM SODIUM 100 GRAM(S): 250; 125 INJECTION, POWDER, FOR SUSPENSION INTRAMUSCULAR; INTRAVENOUS at 06:35

## 2018-08-13 RX ADMIN — Medication 1000 MILLIGRAM(S): at 00:30

## 2018-08-13 RX ADMIN — DEXTROSE MONOHYDRATE, SODIUM CHLORIDE, AND POTASSIUM CHLORIDE 125 MILLILITER(S): 50; .745; 4.5 INJECTION, SOLUTION INTRAVENOUS at 06:35

## 2018-08-13 RX ADMIN — DEXTROSE MONOHYDRATE, SODIUM CHLORIDE, AND POTASSIUM CHLORIDE 125 MILLILITER(S): 50; .745; 4.5 INJECTION, SOLUTION INTRAVENOUS at 12:19

## 2018-08-13 RX ADMIN — AMPICILLIN SODIUM AND SULBACTAM SODIUM 100 GRAM(S): 250; 125 INJECTION, POWDER, FOR SUSPENSION INTRAMUSCULAR; INTRAVENOUS at 00:06

## 2018-08-13 NOTE — BRIEF OPERATIVE NOTE - OPERATION/FINDINGS
~4x3cm perianal abscess.  Purulence expressed and culture taken.  No loculations felt afterward.  Penrose drain left in place.

## 2018-08-13 NOTE — PROGRESS NOTE ADULT - PROBLEM SELECTOR PLAN 4
-c/w home dose ASA 81mg qday  - c/w home dose atorvastatin 40mg qday  - c/w home dose metoprolol 50mg qday with parameters  - Hold off to plavix for now due to surgery tomorrow -c/w home dose ASA 81mg qday  - c/w home dose atorvastatin 40mg qday  - c/w home dose metoprolol 50mg qday with parameters  - Hold off to plavix for now due to surgery today

## 2018-08-13 NOTE — PROGRESS NOTE ADULT - SUBJECTIVE AND OBJECTIVE BOX
POSTOP NOTE    Patient is doing okay.        Vital Signs Last 24 Hrs  T(C): 36.9 (13 Aug 2018 19:03), Max: 37.8 (13 Aug 2018 17:04)  T(F): 98.4 (13 Aug 2018 19:03), Max: 100 (13 Aug 2018 17:04)  HR: 72 (13 Aug 2018 19:03) (58 - 81)  BP: 136/94 (13 Aug 2018 19:03) (108/62 - 143/73)  BP(mean): --  RR: 17 (13 Aug 2018 19:03) (13 - 20)  SpO2: 99% (13 Aug 2018 19:03) (98% - 100%)    Daily Height in cm: 172.72 (13 Aug 2018 16:40)    Daily     I&O's Detail    12 Aug 2018 07:  -  13 Aug 2018 07:00  --------------------------------------------------------  IN:  Total IN: 0 mL    OUT:    Indwelling Catheter - Urethral: 1600 mL  Total OUT: 1600 mL    Total NET: -1600 mL      13 Aug 2018 07:01  -  13 Aug 2018 21:37  --------------------------------------------------------  IN:    Lactated Ringers IV Bolus: 700 mL  Total IN: 700 mL    OUT:    Estimated Blood Loss: 10 mL    Indwelling Catheter - Urethral: 1700 mL  Total OUT: 1710 mL    Total NET: -1010 mL          MEDICATIONS  (STANDING):  ampicillin/sulbactam  IVPB 1.5 Gram(s) IV Intermittent every 6 hours  aspirin  chewable 81 milliGRAM(s) Oral daily  atorvastatin 40 milliGRAM(s) Oral at bedtime  dextrose 5% + sodium chloride 0.45% with potassium chloride 20 mEq/L 1000 milliLiter(s) (125 mL/Hr) IV Continuous <Continuous>  enalapril 5 milliGRAM(s) Oral daily  enoxaparin Injectable 40 milliGRAM(s) SubCutaneous daily  metoprolol succinate ER 50 milliGRAM(s) Oral daily  tamsulosin 0.4 milliGRAM(s) Oral at bedtime    MEDICATIONS  (PRN):  morphine  - Injectable 2 milliGRAM(s) IV Push every 4 hours PRN Moderate Pain (4 - 6)  ondansetron Injectable 4 milliGRAM(s) IV Push every 6 hours PRN Nausea and/or Vomiting      PHYSICAL EXAM:  GENERAL: In no acute distress  CHEST/LUNG: Clear to percussion bilaterally; Normal respiratory effort  HEART: Regular rate and rhythm  ABDOMEN: Soft, Nontender, Nondistended; Bowel sounds present  EXTREMITIES:  No clubbing, cyanosis, or edema  Perianal dressing in place    LABS:                        13.4   9.8   )-----------( 208      ( 13 Aug 2018 06:41 )             39.6     Neutrophil %:       139  |  105  |  9   ----------------------------<  113<H>  3.3<L>   |  27  |  0.99    Ca    9.0      13 Aug 2018 06:41    TPro  7.0  /  Alb  3.0<L>  /  TBili  0.6  /  DBili  x   /  AST  15  /  ALT  35  /  AlkPhos  58  08-12    PT/INR - ( 13 Aug 2018 06:41 )   PT: 14.5 sec;   INR: 1.32 ratio         PTT - ( 13 Aug 2018 06:41 )  PTT:46.4 sec  Urinalysis Basic - ( 12 Aug 2018 00:58 )    Color: Yellow / Appearance: Clear / S.015 / pH: x  Gluc: x / Ketone: Negative  / Bili: Negative / Urobili: 4   Blood: x / Protein: 15 / Nitrite: Negative   Leuk Esterase: Negative / RBC: 0-2 /HPF / WBC 0-2 /HPF   Sq Epi: x / Non Sq Epi: Occasional /HPF / Bacteria: Trace /HPF        A/P-    S/P I&D Perianal abscess  Doing well  Pain control  IV Abx  postop care

## 2018-08-13 NOTE — PROGRESS NOTE ADULT - SUBJECTIVE AND OBJECTIVE BOX
pt seen in icu [  ], reg med floor [ x  ], bed [x  ], chair at bedside [   ]    Awake , alert , comfortable in bed in NAD .     REVIEW OF SYSTEMS:    CONSTITUTIONAL: No weakness, fevers or chills  EYES/ENT: No visual changes;  No vertigo or throat pain   NECK: No pain or stiffness  RESPIRATORY: No cough, wheezing, hemoptysis; No shortness of breath  CARDIOVASCULAR: No chest pain or palpitations  GASTROINTESTINAL: No abdominal or epigastric pain. No nausea, vomiting, or hematemesis; No diarrhea or constipation. No melena or hematochezia.  GENITOURINARY: No dysuria, frequency or hematuria  NEUROLOGICAL: No numbness or weakness  SKIN: No itching, burning, rashes, or lesions   All other review of systems is negative unless indicated above.    Physical Exam    General: WN/WD NAD  Neurology: A&Ox3, nonfocal, CLEMENTE x 4  Respiratory: CTA B/L  CV: RRR, S1S2, no murmurs, rubs or gallops  Abdominal: Soft, NT, ND +BS, Last BM  Extremities: No edema, + peripheral pulses      Allergies  No Known Allergies      Health Issues  Sepsis  HTN (hypertension)  BPH (benign prostatic hyperplasia)  Enlarged prostate  MI (Myocardial Infarction)  Stented Coronary Artery  No significant past surgical history      Vitals  T(F): 97.9 (08-13-18 @ 05:44), Max: 99.6 (08-12-18 @ 22:29)  HR: 59 (08-13-18 @ 05:44) (59 - 85)  BP: 139/65 (08-13-18 @ 05:44) (139/65 - 143/73)  RR: 16 (08-13-18 @ 05:44) (16 - 16)  SpO2: 100% (08-13-18 @ 05:44) (98% - 100%)  Wt(kg): --  CAPILLARY BLOOD GLUCOSE      POCT Blood Glucose.: 121 mg/dL (13 Aug 2018 06:19)      Labs                          13.4   9.8   )-----------( 208      ( 13 Aug 2018 06:41 )             39.6       08-13    139  |  105  |  9   ----------------------------<  113<H>  3.3<L>   |  27  |  0.99    Ca    9.0      13 Aug 2018 06:41    TPro  7.0  /  Alb  3.0<L>  /  TBili  0.6  /  DBili  x   /  AST  15  /  ALT  35  /  AlkPhos  58  08-12            Radiology Results  < from: CT Abdomen and Pelvis w/ IV Cont (08.12.18 @ 03:30) >  IMPRESSION: 4.0 x 2.4 cm left perianal abscess.    < end of copied text >        Meds    MEDICATIONS  (STANDING):  ampicillin/sulbactam  IVPB 1.5 Gram(s) IV Intermittent every 6 hours  aspirin  chewable 81 milliGRAM(s) Oral daily  atorvastatin 40 milliGRAM(s) Oral at bedtime  dextrose 5% + sodium chloride 0.45% with potassium chloride 20 mEq/L 1000 milliLiter(s) (125 mL/Hr) IV Continuous <Continuous>  enalapril 5 milliGRAM(s) Oral daily  enoxaparin Injectable 40 milliGRAM(s) SubCutaneous daily  metoprolol succinate ER 50 milliGRAM(s) Oral daily  potassium chloride  10 mEq/100 mL IVPB 10 milliEquivalent(s) IV Intermittent every 1 hour  tamsulosin 0.4 milliGRAM(s) Oral at bedtime      MEDICATIONS  (PRN):  morphine  - Injectable 2 milliGRAM(s) IV Push every 4 hours PRN Moderate Pain (4 - 6)  ondansetron Injectable 4 milliGRAM(s) IV Push every 6 hours PRN Nausea and/or Vomiting pt seen in icu [  ], reg med floor [ x  ], bed [x  ], chair at bedside [   ]    Awake , alert , comfortable in bed in NAD. Stable for surgical intervention     REVIEW OF SYSTEMS:    CONSTITUTIONAL: No weakness, fevers or chills  EYES/ENT: No visual changes;  No vertigo or throat pain   NECK: No pain or stiffness  RESPIRATORY: No cough, wheezing, hemoptysis; No shortness of breath  CARDIOVASCULAR: No chest pain or palpitations  GASTROINTESTINAL: No abdominal or epigastric pain. No nausea, vomiting, or hematemesis; No diarrhea or constipation. No melena or hematochezia.  GENITOURINARY: No dysuria, frequency or hematuria  NEUROLOGICAL: No numbness or weakness  SKIN: No itching, burning, rashes, or lesions   All other review of systems is negative unless indicated above.    Physical Exam    General: WN/WD NAD  Neurology: A&Ox3, nonfocal, CLEMENTE x 4  Respiratory: CTA B/L  CV: RRR, S1S2, no murmurs, rubs or gallops  Abdominal: Soft, NT, ND +BS, Last BM. Perirectal tenderness  Extremities: No edema, + peripheral pulses      Allergies  No Known Allergies      Health Issues  Sepsis  HTN (hypertension)  BPH (benign prostatic hyperplasia)  Enlarged prostate  MI (Myocardial Infarction)  Stented Coronary Artery  No significant past surgical history      Vitals  T(F): 97.9 (08-13-18 @ 05:44), Max: 99.6 (08-12-18 @ 22:29)  HR: 59 (08-13-18 @ 05:44) (59 - 85)  BP: 139/65 (08-13-18 @ 05:44) (139/65 - 143/73)  RR: 16 (08-13-18 @ 05:44) (16 - 16)  SpO2: 100% (08-13-18 @ 05:44) (98% - 100%)  Wt(kg): --  CAPILLARY BLOOD GLUCOSE      POCT Blood Glucose.: 121 mg/dL (13 Aug 2018 06:19)      Labs                          13.4   9.8   )-----------( 208      ( 13 Aug 2018 06:41 )             39.6       08-13    139  |  105  |  9   ----------------------------<  113<H>  3.3<L>   |  27  |  0.99    Ca    9.0      13 Aug 2018 06:41    TPro  7.0  /  Alb  3.0<L>  /  TBili  0.6  /  DBili  x   /  AST  15  /  ALT  35  /  AlkPhos  58  08-12            Radiology Results  < from: CT Abdomen and Pelvis w/ IV Cont (08.12.18 @ 03:30) >  IMPRESSION: 4.0 x 2.4 cm left perianal abscess.    < end of copied text >    < from: Transthoracic Echocardiogram (08.12.18 @ 11:48) >  CONCLUSIONS:  1. Mild mitral regurgitation.  2. Moderate left atrial enlargement.  3. Normal Left Ventricular Systolic Function,  (EF = 55 to  60%)  4. Grade II diastolic dysfunction.  5. RV systolic pressure is moderately increased at  46 mm    < end of copied text >      Meds    MEDICATIONS  (STANDING):  ampicillin/sulbactam  IVPB 1.5 Gram(s) IV Intermittent every 6 hours  aspirin  chewable 81 milliGRAM(s) Oral daily  atorvastatin 40 milliGRAM(s) Oral at bedtime  dextrose 5% + sodium chloride 0.45% with potassium chloride 20 mEq/L 1000 milliLiter(s) (125 mL/Hr) IV Continuous <Continuous>  enalapril 5 milliGRAM(s) Oral daily  enoxaparin Injectable 40 milliGRAM(s) SubCutaneous daily  metoprolol succinate ER 50 milliGRAM(s) Oral daily  potassium chloride  10 mEq/100 mL IVPB 10 milliEquivalent(s) IV Intermittent every 1 hour  tamsulosin 0.4 milliGRAM(s) Oral at bedtime      MEDICATIONS  (PRN):  morphine  - Injectable 2 milliGRAM(s) IV Push every 4 hours PRN Moderate Pain (4 - 6)  ondansetron Injectable 4 milliGRAM(s) IV Push every 6 hours PRN Nausea and/or Vomiting

## 2018-08-13 NOTE — PROGRESS NOTE ADULT - PROBLEM SELECTOR PLAN 1
Plan for OR tomorrow 8/11/18 for I&D  METS>4 (Patient is able to climb 4 flight of stairs and walk ~2 miles without SOB or fatigue, He is independent in his daily living and is able to lift heavy furniture. He works as a )  RCRI- 0.9% risk of major cardiac events  MARTINES score: 0.2% risk of Myocardial infarction or cardiac arrest intraoperatively of upto 30 days post-op  EKG- Sinus rythm with PVC, no prior EKG to compare with  No current chest pain or SOB  - Will obtain ECHO  - Patient is at MODERATE RISK for OR cont antibiotics  pain control  Surgical follow up  Stable for surgery with moderate risk

## 2018-08-13 NOTE — BRIEF OPERATIVE NOTE - PROCEDURE
<<-----Click on this checkbox to enter Procedure Incision and drainage of perianal abscess  08/13/2018    Active  ROSALIEAMAN

## 2018-08-13 NOTE — PROGRESS NOTE ADULT - SUBJECTIVE AND OBJECTIVE BOX
Patient seen and examined at bedside.  States that the IV tylenol helped his pain, and feels as though the abscess shrunk in size with antibiotics.  Last meal yesterday evening.  Denies chest pain, shortness of breath.  Medicine consultation appreciated.    ICU Vital Signs Last 24 Hrs  T(C): 36.6 (13 Aug 2018 05:44), Max: 37.6 (12 Aug 2018 22:29)  T(F): 97.9 (13 Aug 2018 05:44), Max: 99.6 (12 Aug 2018 22:29)  HR: 59 (13 Aug 2018 05:44) (59 - 85)  BP: 139/65 (13 Aug 2018 05:44) (135/81 - 143/73)  BP(mean): --  ABP: --  ABP(mean): --  RR: 16 (13 Aug 2018 05:44) (16 - 20)  SpO2: 100% (13 Aug 2018 05:44) (98% - 100%)    Gen: NAD, resting comfortably in bed  Cor: RRR  Pulm: nonlabored  Abd: soft    WBC 9.8    A+P:  61 yo M with PMH CAD and MI requiring multiple stents presents with 8 day history of rectal pain.  Found to have a 4x2.4cm perianal abscess.  On abx; no fevers or leukocytosis.  1) Plan for Incision and Drainage today  2) F/u ECHO and Medicine  3) Cont. abx  4) NPO Patient seen and examined at bedside.  States that the IV tylenol helped his pain, and feels as though the abscess shrunk in size with antibiotics.  Last meal yesterday evening.  Denies chest pain, shortness of breath.  Medicine consultation appreciated.  TTE was performed but not yet read - medical clearance is pending      ICU Vital Signs Last 24 Hrs  T(C): 36.6 (13 Aug 2018 05:44), Max: 37.6 (12 Aug 2018 22:29)  T(F): 97.9 (13 Aug 2018 05:44), Max: 99.6 (12 Aug 2018 22:29)  HR: 59 (13 Aug 2018 05:44) (59 - 85)  BP: 139/65 (13 Aug 2018 05:44) (135/81 - 143/73)  BP(mean): --  ABP: --  ABP(mean): --  RR: 16 (13 Aug 2018 05:44) (16 - 20)  SpO2: 100% (13 Aug 2018 05:44) (98% - 100%)    Gen: NAD, resting comfortably in bed  Cor: RRR  Pulm: nonlabored  Abd: soft  Perianal pain on exam      WBC 9.8    A+P:  63 yo M with PMH CAD and MI requiring multiple stents presents with 8 day history of rectal pain.  Found to have a 4x2.4cm perianal abscess.  On abx; no fevers or leukocytosis.  1) Plan for Incision and Drainage today  2) F/u ECHO and Medicine  3) Cont. abx  4) NPO

## 2018-08-14 ENCOUNTER — TRANSCRIPTION ENCOUNTER (OUTPATIENT)
Age: 63
End: 2018-08-14

## 2018-08-14 VITALS
TEMPERATURE: 99 F | SYSTOLIC BLOOD PRESSURE: 132 MMHG | RESPIRATION RATE: 17 BRPM | DIASTOLIC BLOOD PRESSURE: 70 MMHG | OXYGEN SATURATION: 100 % | HEART RATE: 70 BPM

## 2018-08-14 LAB
ANION GAP SERPL CALC-SCNC: 7 MMOL/L — SIGNIFICANT CHANGE UP (ref 5–17)
BUN SERPL-MCNC: 9 MG/DL — SIGNIFICANT CHANGE UP (ref 7–18)
CALCIUM SERPL-MCNC: 8.6 MG/DL — SIGNIFICANT CHANGE UP (ref 8.4–10.5)
CHLORIDE SERPL-SCNC: 105 MMOL/L — SIGNIFICANT CHANGE UP (ref 96–108)
CO2 SERPL-SCNC: 27 MMOL/L — SIGNIFICANT CHANGE UP (ref 22–31)
CREAT SERPL-MCNC: 1.02 MG/DL — SIGNIFICANT CHANGE UP (ref 0.5–1.3)
GLUCOSE SERPL-MCNC: 116 MG/DL — HIGH (ref 70–99)
HCT VFR BLD CALC: 36.3 % — LOW (ref 39–50)
HGB BLD-MCNC: 12.6 G/DL — LOW (ref 13–17)
MCHC RBC-ENTMCNC: 30.1 PG — SIGNIFICANT CHANGE UP (ref 27–34)
MCHC RBC-ENTMCNC: 34.7 GM/DL — SIGNIFICANT CHANGE UP (ref 32–36)
MCV RBC AUTO: 86.7 FL — SIGNIFICANT CHANGE UP (ref 80–100)
PLATELET # BLD AUTO: 214 K/UL — SIGNIFICANT CHANGE UP (ref 150–400)
POTASSIUM SERPL-MCNC: 3.9 MMOL/L — SIGNIFICANT CHANGE UP (ref 3.5–5.3)
POTASSIUM SERPL-SCNC: 3.9 MMOL/L — SIGNIFICANT CHANGE UP (ref 3.5–5.3)
RBC # BLD: 4.18 M/UL — LOW (ref 4.2–5.8)
RBC # FLD: 11.1 % — SIGNIFICANT CHANGE UP (ref 10.3–14.5)
SODIUM SERPL-SCNC: 139 MMOL/L — SIGNIFICANT CHANGE UP (ref 135–145)
WBC # BLD: 8.3 K/UL — SIGNIFICANT CHANGE UP (ref 3.8–10.5)
WBC # FLD AUTO: 8.3 K/UL — SIGNIFICANT CHANGE UP (ref 3.8–10.5)

## 2018-08-14 RX ORDER — TRAMADOL HYDROCHLORIDE 50 MG/1
1 TABLET ORAL
Qty: 20 | Refills: 0 | OUTPATIENT
Start: 2018-08-14 | End: 2018-08-18

## 2018-08-14 RX ORDER — TAMSULOSIN HYDROCHLORIDE 0.4 MG/1
1 CAPSULE ORAL
Qty: 30 | Refills: 0
Start: 2018-08-14 | End: 2018-09-12

## 2018-08-14 RX ADMIN — Medication 50 MILLIGRAM(S): at 05:09

## 2018-08-14 RX ADMIN — Medication 5 MILLIGRAM(S): at 05:09

## 2018-08-14 RX ADMIN — AMPICILLIN SODIUM AND SULBACTAM SODIUM 100 GRAM(S): 250; 125 INJECTION, POWDER, FOR SUSPENSION INTRAMUSCULAR; INTRAVENOUS at 11:18

## 2018-08-14 RX ADMIN — Medication 81 MILLIGRAM(S): at 11:18

## 2018-08-14 RX ADMIN — AMPICILLIN SODIUM AND SULBACTAM SODIUM 100 GRAM(S): 250; 125 INJECTION, POWDER, FOR SUSPENSION INTRAMUSCULAR; INTRAVENOUS at 05:09

## 2018-08-14 RX ADMIN — ENOXAPARIN SODIUM 40 MILLIGRAM(S): 100 INJECTION SUBCUTANEOUS at 11:19

## 2018-08-14 NOTE — PROGRESS NOTE ADULT - SUBJECTIVE AND OBJECTIVE BOX
Patient seen and examined at bedside.  No acute events.  States that his perianal pain has greatly improved.  Denies nausea, vomiting, fevers, chills, chest pain, or shortness of breath.    Vital Signs Last 24 Hrs  T(C): 37 (14 Aug 2018 05:04), Max: 37.8 (13 Aug 2018 17:04)  T(F): 98.6 (14 Aug 2018 05:04), Max: 100 (13 Aug 2018 17:04)  HR: 63 (14 Aug 2018 05:04) (58 - 81)  BP: 136/71 (14 Aug 2018 05:04) (108/62 - 141/67)  BP(mean): --  RR: 17 (14 Aug 2018 05:04) (13 - 20)  SpO2: 100% (14 Aug 2018 05:04) (99% - 100%)    Gen: NAD  Cor: RRR  Pulm: nonlabored  Abd: soft  Pelvis: dressing intact with serosanguinous strikethrough.  penrose drain in place within perianal abscess cavity.  Clotted blood surround perianal abscess cavity.  No purulence noted.  : Ruiz in place with clear yellow urine  Extr: SCDs    A+P:  63 yo M POD#1 s/p perianal abscess incision and drainage doing well.  Ruiz in place for urinary retention.  1) D/C ruiz  2) Allow patient to shower  3) Dressing change after bathing  4) Likely d/c home this afternoon

## 2018-08-14 NOTE — PROGRESS NOTE ADULT - SUBJECTIVE AND OBJECTIVE BOX
pt seen in icu [  ], reg med floor [ x  ], bed [ x ], chair at bedside [   ]  Awake, alert, comfortable in bed in NAD. S/p drainage of perirectal abcess  REVIEW OF SYSTEMS:    CONSTITUTIONAL: No weakness, fevers or chills  EYES/ENT: No visual changes;  No vertigo or throat pain   NECK: No pain or stiffness  RESPIRATORY: No cough, wheezing, hemoptysis; No shortness of breath  CARDIOVASCULAR: No chest pain or palpitations  GASTROINTESTINAL: No abdominal or epigastric pain. No nausea, vomiting, or hematemesis; No diarrhea or constipation. No melena or hematochezia. perirectal pain  GENITOURINARY: No dysuria, frequency or hematuria  NEUROLOGICAL: No numbness or weakness  SKIN: No itching, burning, rashes, or lesions   All other review of systems is negative unless indicated above.    Physical Exam    General: WN/WD NAD  Neurology: A&Ox3, nonfocal, CLEMENTE x 4  Respiratory: CTA B/L  CV: RRR, S1S2, no murmurs, rubs or gallops  Abdominal: Soft, NT, ND +BS, Last BM, incisional tenderness  Extremities: No edema, + peripheral pulses      Allergies  No Known Allergies      Health Issues  Sepsis  HTN (hypertension)  BPH (benign prostatic hyperplasia)  Enlarged prostate  MI (Myocardial Infarction)  Stented Coronary Artery  No significant past surgical history      Vitals  T(F): 98 (08-14-18 @ 14:12), Max: 100 (08-13-18 @ 17:04)  HR: 72 (08-14-18 @ 14:12) (61 - 81)  BP: 133/71 (08-14-18 @ 14:12) (108/62 - 139/64)  RR: 17 (08-14-18 @ 14:12) (13 - 20)  SpO2: 100% (08-14-18 @ 14:12) (99% - 100%)  Wt(kg): --  CAPILLARY BLOOD GLUCOSE      POCT Blood Glucose.: 121 mg/dL (13 Aug 2018 06:19)      Labs                          12.6   8.3   )-----------( 214      ( 14 Aug 2018 07:58 )             36.3       08-14    139  |  105  |  9   ----------------------------<  116<H>  3.9   |  27  |  1.02    Ca    8.6      14 Aug 2018 07:58              Radiology Results      Meds    MEDICATIONS  (STANDING):  ampicillin/sulbactam  IVPB 1.5 Gram(s) IV Intermittent every 6 hours  aspirin  chewable 81 milliGRAM(s) Oral daily  atorvastatin 40 milliGRAM(s) Oral at bedtime  dextrose 5% + sodium chloride 0.45% with potassium chloride 20 mEq/L 1000 milliLiter(s) (125 mL/Hr) IV Continuous <Continuous>  enalapril 5 milliGRAM(s) Oral daily  enoxaparin Injectable 40 milliGRAM(s) SubCutaneous daily  metoprolol succinate ER 50 milliGRAM(s) Oral daily  tamsulosin 0.4 milliGRAM(s) Oral at bedtime      MEDICATIONS  (PRN):  morphine  - Injectable 2 milliGRAM(s) IV Push every 4 hours PRN Moderate Pain (4 - 6)  ondansetron Injectable 4 milliGRAM(s) IV Push every 6 hours PRN Nausea and/or Vomiting

## 2018-08-14 NOTE — PROGRESS NOTE ADULT - PROBLEM SELECTOR PLAN 4
-c/w home dose ASA 81mg qday  - c/w home dose atorvastatin 40mg qday  - c/w home dose metoprolol 50mg qday with parameters  - Hold off to plavix for now due to surgery today

## 2018-08-14 NOTE — DISCHARGE NOTE ADULT - PLAN OF CARE
Resolution of abscess Please follow up with Dr. Steve within 1 week   Sitz baths 3 times a day   continue oral antibiotics   cover wound with dry dressing

## 2018-08-14 NOTE — DISCHARGE NOTE ADULT - MEDICATION SUMMARY - MEDICATIONS TO TAKE
I will START or STAY ON the medications listed below when I get home from the hospital:    Ultram 50 mg oral tablet  -- 1 tab(s) by mouth every 6 hours, As Needed -for moderate pain MDD:4 tabs  -- Caution federal law prohibits the transfer of this drug to any person other  than the person for whom it was prescribed.  May cause drowsiness.  Alcohol may intensify this effect.  Use care when operating dangerous machinery.  Obtain medical advice before taking any non-prescription drugs as some may affect the action of this medication.    -- Indication: For Prn pain     enalapril 5 mg oral tablet  -- 1 tab(s) by mouth once a day  -- Indication: For HTN (hypertension)    alfuzosin  -- Indication: For BPH (benign prostatic hyperplasia)    Flomax 0.4 mg oral capsule  -- 1 cap(s) by mouth once a day   -- It is very important that you take or use this exactly as directed.  Do not skip doses or discontinue unless directed by your doctor.  May cause drowsiness.  Alcohol may intensify this effect.  Use care when operating dangerous machinery.  Some non-prescription drugs may aggravate your condition.  Read all labels carefully.  If a warning appears, check with your doctor before taking.  Swallow whole.  Do not crush.  Take with food or milk.    -- Indication: For BPH (benign prostatic hyperplasia)    atorvastatin 40 mg oral tablet  -- 1 tab(s) by mouth once a day  -- Indication: For Hld     Plavix  -- 75  orally  -- Indication: For CAD (coronary artery disease)    metoprolol succinate 25 mg oral tablet, extended release  -- 2 tab(s) by mouth once a day  -- Indication: For CAD (coronary artery disease)    amoxicillin-clavulanate 875 mg-125 mg oral tablet  -- 1 tab(s) by mouth every 12 hours   -- Finish all this medication unless otherwise directed by prescriber.  Take with food or milk.    -- Indication: For Perianal abscess

## 2018-08-14 NOTE — PROGRESS NOTE ADULT - ATTENDING COMMENTS
Agree with above  He looks good clinically  Penrose in place, no purulent drainage around it    - dc ruiz for trial of void  - dc home today if voids  - fu with me in the office in one week for drain removal
Agree w above  Palpable perianal abscess along left lateral aspect. Requires drainage in OR under sedation  Significant cardiac history, awaiting medicine Dr Welsh input for optimization    -Hold plavix  -continue asa 81 mg  -medical consult Dr Welsh  -npo at midnight
Agree w above  Pending I&D in OR today  Pending report of TTE and medical clearance

## 2018-08-14 NOTE — DISCHARGE NOTE ADULT - HOSPITAL COURSE
63 y/o male with h/o BPH, HTN, MI 20 yrs ago with cardiac stenting possibly x8 per pt during past 20 yrs  currently on asa/plavix/statin/bblockers  pt unsure of medication dosages, will ask wife to bring meds  pt comes with c/o rectal pain for about 8 days with some chills, no f/n/v  pt ate some seafood about 3 weeks ago with resulting diarrhea, hives    < from: CT Abdomen and Pelvis w/ IV Cont (08.12.18 @ 03:30) >  IMPRESSION: 4.0 x 2.4 cm left perianal abscess.  Patient was taken to the OR and underwent incision and drainage of perianal abscess with placement of penrose. Patient tolerated procedure well. For d/c home with penrose and oral antibiotics

## 2018-08-14 NOTE — DISCHARGE NOTE ADULT - CARE PLAN
Principal Discharge DX:	Perianal abscess  Goal:	Resolution of abscess  Assessment and plan of treatment:	Please follow up with Dr. Steve within 1 week   Sitz baths 3 times a day   continue oral antibiotics   cover wound with dry dressing

## 2018-08-14 NOTE — DISCHARGE NOTE ADULT - PATIENT PORTAL LINK FT
You can access the [facility] Patient Portal, offered by [facility], by registering with the following website: [email or web address]

## 2018-08-15 PROBLEM — N40.0 BENIGN PROSTATIC HYPERPLASIA WITHOUT LOWER URINARY TRACT SYMPTOMS: Chronic | Status: ACTIVE | Noted: 2018-08-12

## 2018-08-15 PROBLEM — I10 ESSENTIAL (PRIMARY) HYPERTENSION: Chronic | Status: ACTIVE | Noted: 2018-08-12

## 2018-08-15 LAB
-  AMIKACIN: SIGNIFICANT CHANGE UP
-  AMIKACIN: SIGNIFICANT CHANGE UP
-  AMOXICILLIN/CLAVULANIC ACID: SIGNIFICANT CHANGE UP
-  AMOXICILLIN/CLAVULANIC ACID: SIGNIFICANT CHANGE UP
-  AMPICILLIN/SULBACTAM: SIGNIFICANT CHANGE UP
-  AMPICILLIN/SULBACTAM: SIGNIFICANT CHANGE UP
-  AMPICILLIN: SIGNIFICANT CHANGE UP
-  AMPICILLIN: SIGNIFICANT CHANGE UP
-  AZTREONAM: SIGNIFICANT CHANGE UP
-  AZTREONAM: SIGNIFICANT CHANGE UP
-  CEFAZOLIN: SIGNIFICANT CHANGE UP
-  CEFAZOLIN: SIGNIFICANT CHANGE UP
-  CEFEPIME: SIGNIFICANT CHANGE UP
-  CEFEPIME: SIGNIFICANT CHANGE UP
-  CEFOXITIN: SIGNIFICANT CHANGE UP
-  CEFOXITIN: SIGNIFICANT CHANGE UP
-  CEFTRIAXONE: SIGNIFICANT CHANGE UP
-  CEFTRIAXONE: SIGNIFICANT CHANGE UP
-  CIPROFLOXACIN: SIGNIFICANT CHANGE UP
-  CIPROFLOXACIN: SIGNIFICANT CHANGE UP
-  ERTAPENEM: SIGNIFICANT CHANGE UP
-  ERTAPENEM: SIGNIFICANT CHANGE UP
-  GENTAMICIN: SIGNIFICANT CHANGE UP
-  GENTAMICIN: SIGNIFICANT CHANGE UP
-  IMIPENEM: SIGNIFICANT CHANGE UP
-  IMIPENEM: SIGNIFICANT CHANGE UP
-  LEVOFLOXACIN: SIGNIFICANT CHANGE UP
-  LEVOFLOXACIN: SIGNIFICANT CHANGE UP
-  MEROPENEM: SIGNIFICANT CHANGE UP
-  MEROPENEM: SIGNIFICANT CHANGE UP
-  PIPERACILLIN/TAZOBACTAM: SIGNIFICANT CHANGE UP
-  PIPERACILLIN/TAZOBACTAM: SIGNIFICANT CHANGE UP
-  TOBRAMYCIN: SIGNIFICANT CHANGE UP
-  TOBRAMYCIN: SIGNIFICANT CHANGE UP
-  TRIMETHOPRIM/SULFAMETHOXAZOLE: SIGNIFICANT CHANGE UP
-  TRIMETHOPRIM/SULFAMETHOXAZOLE: SIGNIFICANT CHANGE UP
CULTURE RESULTS: SIGNIFICANT CHANGE UP
METHOD TYPE: SIGNIFICANT CHANGE UP
METHOD TYPE: SIGNIFICANT CHANGE UP
ORGANISM # SPEC MICROSCOPIC CNT: SIGNIFICANT CHANGE UP
SPECIMEN SOURCE: SIGNIFICANT CHANGE UP

## 2018-08-15 PROCEDURE — 93306 TTE W/DOPPLER COMPLETE: CPT

## 2018-08-15 PROCEDURE — 85610 PROTHROMBIN TIME: CPT

## 2018-08-15 PROCEDURE — 99291 CRITICAL CARE FIRST HOUR: CPT | Mod: 25

## 2018-08-15 PROCEDURE — 96375 TX/PRO/DX INJ NEW DRUG ADDON: CPT

## 2018-08-15 PROCEDURE — 86900 BLOOD TYPING SEROLOGIC ABO: CPT

## 2018-08-15 PROCEDURE — 87186 SC STD MICRODIL/AGAR DIL: CPT

## 2018-08-15 PROCEDURE — 96374 THER/PROPH/DIAG INJ IV PUSH: CPT | Mod: XU

## 2018-08-15 PROCEDURE — 86850 RBC ANTIBODY SCREEN: CPT

## 2018-08-15 PROCEDURE — 83605 ASSAY OF LACTIC ACID: CPT

## 2018-08-15 PROCEDURE — 80053 COMPREHEN METABOLIC PANEL: CPT

## 2018-08-15 PROCEDURE — 85027 COMPLETE CBC AUTOMATED: CPT

## 2018-08-15 PROCEDURE — 81001 URINALYSIS AUTO W/SCOPE: CPT

## 2018-08-15 PROCEDURE — 85730 THROMBOPLASTIN TIME PARTIAL: CPT

## 2018-08-15 PROCEDURE — 80048 BASIC METABOLIC PNL TOTAL CA: CPT

## 2018-08-15 PROCEDURE — 83690 ASSAY OF LIPASE: CPT

## 2018-08-15 PROCEDURE — 87070 CULTURE OTHR SPECIMN AEROBIC: CPT

## 2018-08-15 PROCEDURE — 93005 ELECTROCARDIOGRAM TRACING: CPT

## 2018-08-15 PROCEDURE — 82962 GLUCOSE BLOOD TEST: CPT

## 2018-08-15 PROCEDURE — 87040 BLOOD CULTURE FOR BACTERIA: CPT

## 2018-08-15 PROCEDURE — 86901 BLOOD TYPING SEROLOGIC RH(D): CPT

## 2018-08-15 PROCEDURE — 74177 CT ABD & PELVIS W/CONTRAST: CPT

## 2018-08-17 LAB
CULTURE RESULTS: SIGNIFICANT CHANGE UP
CULTURE RESULTS: SIGNIFICANT CHANGE UP
SPECIMEN SOURCE: SIGNIFICANT CHANGE UP
SPECIMEN SOURCE: SIGNIFICANT CHANGE UP

## 2018-08-22 ENCOUNTER — APPOINTMENT (OUTPATIENT)
Dept: BARIATRICS | Facility: CLINIC | Age: 63
End: 2018-08-22
Payer: COMMERCIAL

## 2018-08-22 VITALS
TEMPERATURE: 98.2 F | HEART RATE: 78 BPM | DIASTOLIC BLOOD PRESSURE: 73 MMHG | WEIGHT: 178 LBS | BODY MASS INDEX: 26.98 KG/M2 | OXYGEN SATURATION: 100 % | SYSTOLIC BLOOD PRESSURE: 112 MMHG | HEIGHT: 68 IN

## 2018-08-22 PROCEDURE — 99024 POSTOP FOLLOW-UP VISIT: CPT

## 2018-08-23 RX ORDER — HYDROCORTISONE 2.5% 25 MG/G
2.5 CREAM TOPICAL
Qty: 30 | Refills: 0 | Status: ACTIVE | COMMUNITY
Start: 2018-08-08

## 2018-08-23 RX ORDER — METOPROLOL TARTRATE 50 MG/1
50 TABLET, FILM COATED ORAL
Refills: 0 | Status: ACTIVE | COMMUNITY

## 2018-08-23 RX ORDER — TAMSULOSIN HYDROCHLORIDE 0.4 MG/1
0.4 CAPSULE ORAL
Qty: 30 | Refills: 0 | Status: ACTIVE | COMMUNITY
Start: 2018-08-14

## 2018-08-23 RX ORDER — ATORVASTATIN CALCIUM 40 MG/1
40 TABLET, FILM COATED ORAL
Refills: 0 | Status: ACTIVE | COMMUNITY

## 2018-08-23 RX ORDER — ALFUZOSIN HYDROCHLORIDE 10 MG/1
10 TABLET, EXTENDED RELEASE ORAL
Refills: 0 | Status: ACTIVE | COMMUNITY

## 2018-08-23 RX ORDER — ENALAPRIL MALEATE 5 MG/1
5 TABLET ORAL
Refills: 0 | Status: ACTIVE | COMMUNITY

## 2018-08-23 RX ORDER — CLOPIDOGREL BISULFATE 75 MG/1
75 TABLET, FILM COATED ORAL
Refills: 0 | Status: ACTIVE | COMMUNITY

## 2018-09-21 ENCOUNTER — APPOINTMENT (OUTPATIENT)
Dept: BARIATRICS | Facility: CLINIC | Age: 63
End: 2018-09-21
Payer: COMMERCIAL

## 2018-09-21 VITALS
SYSTOLIC BLOOD PRESSURE: 97 MMHG | BODY MASS INDEX: 26.98 KG/M2 | DIASTOLIC BLOOD PRESSURE: 60 MMHG | HEART RATE: 87 BPM | HEIGHT: 68 IN | WEIGHT: 178 LBS | TEMPERATURE: 98.6 F

## 2018-09-21 DIAGNOSIS — Z86.79 PERSONAL HISTORY OF OTHER DISEASES OF THE CIRCULATORY SYSTEM: ICD-10-CM

## 2018-09-21 DIAGNOSIS — I25.2 OLD MYOCARDIAL INFARCTION: ICD-10-CM

## 2018-09-21 PROCEDURE — 99212 OFFICE O/P EST SF 10 MIN: CPT

## 2018-09-21 RX ORDER — TRAMADOL HYDROCHLORIDE 50 MG/1
50 TABLET, COATED ORAL
Qty: 20 | Refills: 0 | Status: DISCONTINUED | COMMUNITY
Start: 2018-08-14 | End: 2018-09-21

## 2018-09-21 RX ORDER — AMOXICILLIN AND CLAVULANATE POTASSIUM 875; 125 MG/1; MG/1
875-125 TABLET, COATED ORAL
Qty: 14 | Refills: 0 | Status: DISCONTINUED | COMMUNITY
Start: 2018-08-14 | End: 2018-09-21

## 2018-09-21 RX ORDER — METRONIDAZOLE 500 MG/1
500 TABLET ORAL
Refills: 0 | Status: ACTIVE | COMMUNITY
Start: 2018-09-21

## 2018-09-21 RX ORDER — METOPROLOL SUCCINATE 50 MG/1
50 TABLET, EXTENDED RELEASE ORAL
Qty: 90 | Refills: 0 | Status: DISCONTINUED | COMMUNITY
Start: 2018-03-26 | End: 2018-09-21

## 2018-09-26 ENCOUNTER — APPOINTMENT (OUTPATIENT)
Dept: COLORECTAL SURGERY | Facility: CLINIC | Age: 63
End: 2018-09-26
Payer: COMMERCIAL

## 2018-09-26 VITALS
HEART RATE: 67 BPM | HEIGHT: 68 IN | SYSTOLIC BLOOD PRESSURE: 116 MMHG | BODY MASS INDEX: 26.98 KG/M2 | DIASTOLIC BLOOD PRESSURE: 76 MMHG | OXYGEN SATURATION: 99 % | WEIGHT: 178 LBS | TEMPERATURE: 97.9 F

## 2018-09-26 DIAGNOSIS — Z98.890 OTHER SPECIFIED POSTPROCEDURAL STATES: ICD-10-CM

## 2018-09-26 PROCEDURE — 46600 DIAGNOSTIC ANOSCOPY SPX: CPT

## 2018-09-26 PROCEDURE — 99244 OFF/OP CNSLTJ NEW/EST MOD 40: CPT | Mod: 25

## 2018-10-17 ENCOUNTER — OUTPATIENT (OUTPATIENT)
Dept: OUTPATIENT SERVICES | Facility: HOSPITAL | Age: 63
LOS: 1 days | End: 2018-10-17
Payer: COMMERCIAL

## 2018-10-17 VITALS
OXYGEN SATURATION: 100 % | HEIGHT: 67 IN | TEMPERATURE: 99 F | SYSTOLIC BLOOD PRESSURE: 116 MMHG | WEIGHT: 179.9 LBS | DIASTOLIC BLOOD PRESSURE: 74 MMHG | RESPIRATION RATE: 16 BRPM | HEART RATE: 56 BPM

## 2018-10-17 DIAGNOSIS — Z98.61 CORONARY ANGIOPLASTY STATUS: Chronic | ICD-10-CM

## 2018-10-17 DIAGNOSIS — K60.3 ANAL FISTULA: ICD-10-CM

## 2018-10-17 DIAGNOSIS — Z01.818 ENCOUNTER FOR OTHER PREPROCEDURAL EXAMINATION: ICD-10-CM

## 2018-10-17 DIAGNOSIS — Z95.5 PRESENCE OF CORONARY ANGIOPLASTY IMPLANT AND GRAFT: ICD-10-CM

## 2018-10-17 LAB
ANION GAP SERPL CALC-SCNC: 6 MMOL/L — SIGNIFICANT CHANGE UP (ref 5–17)
APTT BLD: 39.9 SEC — HIGH (ref 27.5–37.4)
BUN SERPL-MCNC: 19 MG/DL — HIGH (ref 7–18)
CALCIUM SERPL-MCNC: 9.1 MG/DL — SIGNIFICANT CHANGE UP (ref 8.4–10.5)
CHLORIDE SERPL-SCNC: 107 MMOL/L — SIGNIFICANT CHANGE UP (ref 96–108)
CO2 SERPL-SCNC: 27 MMOL/L — SIGNIFICANT CHANGE UP (ref 22–31)
CREAT SERPL-MCNC: 1.02 MG/DL — SIGNIFICANT CHANGE UP (ref 0.5–1.3)
GLUCOSE SERPL-MCNC: 98 MG/DL — SIGNIFICANT CHANGE UP (ref 70–99)
HCT VFR BLD CALC: 35.1 % — LOW (ref 39–50)
HGB BLD-MCNC: 11.9 G/DL — LOW (ref 13–17)
INR BLD: 1.1 RATIO — SIGNIFICANT CHANGE UP (ref 0.88–1.16)
MCHC RBC-ENTMCNC: 29.6 PG — SIGNIFICANT CHANGE UP (ref 27–34)
MCHC RBC-ENTMCNC: 34 GM/DL — SIGNIFICANT CHANGE UP (ref 32–36)
MCV RBC AUTO: 87 FL — SIGNIFICANT CHANGE UP (ref 80–100)
PLATELET # BLD AUTO: 180 K/UL — SIGNIFICANT CHANGE UP (ref 150–400)
POTASSIUM SERPL-MCNC: 3.7 MMOL/L — SIGNIFICANT CHANGE UP (ref 3.5–5.3)
POTASSIUM SERPL-SCNC: 3.7 MMOL/L — SIGNIFICANT CHANGE UP (ref 3.5–5.3)
PROTHROM AB SERPL-ACNC: 12 SEC — SIGNIFICANT CHANGE UP (ref 9.8–12.7)
RBC # BLD: 4.03 M/UL — LOW (ref 4.2–5.8)
RBC # FLD: 12.2 % — SIGNIFICANT CHANGE UP (ref 10.3–14.5)
SODIUM SERPL-SCNC: 140 MMOL/L — SIGNIFICANT CHANGE UP (ref 135–145)
WBC # BLD: 4.2 K/UL — SIGNIFICANT CHANGE UP (ref 3.8–10.5)
WBC # FLD AUTO: 4.2 K/UL — SIGNIFICANT CHANGE UP (ref 3.8–10.5)

## 2018-10-17 PROCEDURE — G0463: CPT

## 2018-10-17 PROCEDURE — 85610 PROTHROMBIN TIME: CPT

## 2018-10-17 PROCEDURE — 85027 COMPLETE CBC AUTOMATED: CPT

## 2018-10-17 PROCEDURE — 80048 BASIC METABOLIC PNL TOTAL CA: CPT

## 2018-10-17 PROCEDURE — 85730 THROMBOPLASTIN TIME PARTIAL: CPT

## 2018-10-17 RX ORDER — SODIUM CHLORIDE 9 MG/ML
3 INJECTION INTRAMUSCULAR; INTRAVENOUS; SUBCUTANEOUS EVERY 8 HOURS
Qty: 0 | Refills: 0 | Status: DISCONTINUED | OUTPATIENT
Start: 2018-10-24 | End: 2018-11-01

## 2018-10-17 NOTE — H&P PST ADULT - PMH
BPH (benign prostatic hyperplasia)    Enlarged prostate    HTN (hypertension)    MI (Myocardial Infarction)    Stented Coronary Artery  x 8 stents

## 2018-10-17 NOTE — H&P PST ADULT - NEGATIVE GENERAL GENITOURINARY SYMPTOMS
no flank pain L/no gas in urine/no renal colic/no hematuria/no incontinence/no flank pain R/no urine discoloration/no bladder infections

## 2018-10-17 NOTE — H&P PST ADULT - HISTORY OF PRESENT ILLNESS
63year old male with pmhx of Myocardial Infarction in 1996 with angioplasties x 4 most recent 2015 and 8 coronary stents, BPH, hypertension and hyperlipidemia presents with c/o eating clams in july 2018 that caused severe abdominal pain,  severe diarrhea and fever. Patient presented in the ER where he was diagnosed with anal abscess. Patient is here today for presurgical testing for scheduled exam under anesthesia, seton placement, fistulotomy on 10/24/18

## 2018-10-17 NOTE — H&P PST ADULT - NSANTHOSAYNRD_GEN_A_CORE
No. HOLLY screening performed.  STOP BANG Legend: 0-2 = LOW Risk; 3-4 = INTERMEDIATE Risk; 5-8 = HIGH Risk

## 2018-10-23 ENCOUNTER — TRANSCRIPTION ENCOUNTER (OUTPATIENT)
Age: 63
End: 2018-10-23

## 2018-10-24 ENCOUNTER — APPOINTMENT (OUTPATIENT)
Dept: COLORECTAL SURGERY | Facility: HOSPITAL | Age: 63
End: 2018-10-24

## 2018-10-24 ENCOUNTER — OUTPATIENT (OUTPATIENT)
Dept: OUTPATIENT SERVICES | Facility: HOSPITAL | Age: 63
LOS: 1 days | End: 2018-10-24
Payer: COMMERCIAL

## 2018-10-24 VITALS
HEART RATE: 70 BPM | SYSTOLIC BLOOD PRESSURE: 122 MMHG | DIASTOLIC BLOOD PRESSURE: 80 MMHG | TEMPERATURE: 98 F | RESPIRATION RATE: 16 BRPM | HEIGHT: 68 IN | WEIGHT: 175.05 LBS | OXYGEN SATURATION: 100 %

## 2018-10-24 VITALS
SYSTOLIC BLOOD PRESSURE: 101 MMHG | OXYGEN SATURATION: 100 % | DIASTOLIC BLOOD PRESSURE: 66 MMHG | HEART RATE: 61 BPM | RESPIRATION RATE: 19 BRPM

## 2018-10-24 DIAGNOSIS — K60.3 ANAL FISTULA: ICD-10-CM

## 2018-10-24 DIAGNOSIS — Z01.818 ENCOUNTER FOR OTHER PREPROCEDURAL EXAMINATION: ICD-10-CM

## 2018-10-24 DIAGNOSIS — Z98.61 CORONARY ANGIOPLASTY STATUS: Chronic | ICD-10-CM

## 2018-10-24 PROCEDURE — 46270 REMOVE ANAL FIST SUBQ: CPT

## 2018-10-24 PROCEDURE — 46275 REMOVE ANAL FIST INTER: CPT

## 2018-10-24 RX ORDER — IBUPROFEN 200 MG
1 TABLET ORAL
Qty: 40 | Refills: 0
Start: 2018-10-24 | End: 2018-11-02

## 2018-10-24 RX ORDER — ACETAMINOPHEN 500 MG
650 TABLET ORAL EVERY 6 HOURS
Qty: 0 | Refills: 0 | Status: DISCONTINUED | OUTPATIENT
Start: 2018-10-24 | End: 2018-11-01

## 2018-10-24 RX ORDER — HYDROMORPHONE HYDROCHLORIDE 2 MG/ML
0.5 INJECTION INTRAMUSCULAR; INTRAVENOUS; SUBCUTANEOUS
Qty: 0 | Refills: 0 | Status: DISCONTINUED | OUTPATIENT
Start: 2018-10-24 | End: 2018-10-24

## 2018-10-24 RX ORDER — ONDANSETRON 8 MG/1
4 TABLET, FILM COATED ORAL ONCE
Qty: 0 | Refills: 0 | Status: DISCONTINUED | OUTPATIENT
Start: 2018-10-24 | End: 2018-10-24

## 2018-10-24 RX ORDER — SODIUM CHLORIDE 9 MG/ML
1000 INJECTION, SOLUTION INTRAVENOUS
Qty: 0 | Refills: 0 | Status: DISCONTINUED | OUTPATIENT
Start: 2018-10-24 | End: 2018-10-24

## 2018-10-24 RX ORDER — OXYCODONE HYDROCHLORIDE 5 MG/1
1 TABLET ORAL
Qty: 10 | Refills: 0
Start: 2018-10-24 | End: 2018-10-28

## 2018-10-24 RX ORDER — IBUPROFEN 200 MG
600 TABLET ORAL EVERY 6 HOURS
Qty: 0 | Refills: 0 | Status: DISCONTINUED | OUTPATIENT
Start: 2018-10-24 | End: 2018-11-01

## 2018-10-24 RX ORDER — SODIUM CHLORIDE 9 MG/ML
1000 INJECTION, SOLUTION INTRAVENOUS
Qty: 0 | Refills: 0 | Status: ON HOLD | OUTPATIENT
Start: 2018-10-24

## 2018-10-24 RX ORDER — ALFUZOSIN HYDROCHLORIDE 10 MG/1
0 TABLET, EXTENDED RELEASE ORAL
Qty: 0 | Refills: 0 | COMMUNITY

## 2018-10-24 RX ORDER — OXYCODONE HYDROCHLORIDE 5 MG/1
5 TABLET ORAL ONCE
Qty: 0 | Refills: 0 | Status: DISCONTINUED | OUTPATIENT
Start: 2018-10-24 | End: 2018-10-24

## 2018-10-24 RX ORDER — HYDROMORPHONE HYDROCHLORIDE 2 MG/ML
2 INJECTION INTRAMUSCULAR; INTRAVENOUS; SUBCUTANEOUS
Qty: 0 | Refills: 0 | Status: ON HOLD | OUTPATIENT
Start: 2018-10-24

## 2018-10-24 RX ORDER — ACETAMINOPHEN 500 MG
1 TABLET ORAL
Qty: 40 | Refills: 0
Start: 2018-10-24 | End: 2018-11-02

## 2018-10-24 RX ADMIN — SODIUM CHLORIDE 3 MILLILITER(S): 9 INJECTION INTRAMUSCULAR; INTRAVENOUS; SUBCUTANEOUS at 12:47

## 2018-10-24 NOTE — ASU DISCHARGE PLAN (ADULT/PEDIATRIC). - NOTIFY
Excessive Diarrhea/Pain not relieved by Medications/Unable to Urinate/Fever greater than 101/Bleeding that does not stop

## 2018-10-24 NOTE — ASU DISCHARGE PLAN (ADULT/PEDIATRIC). - MEDICATION SUMMARY - MEDICATIONS TO TAKE
I will START or STAY ON the medications listed below when I get home from the hospital:    Aspir 81 oral delayed release tablet  -- 1 tab(s) by mouth once a day  -- Indication: For As indicated by PCP    acetaminophen 650 mg oral tablet, extended release  -- 1 tab(s) by mouth every 6 hours, As Needed -for moderate pain MDD:4 tablets. Stagger with motrin.   -- Indication: For Anal fistula     mg oral tablet  -- 1 tab(s) by mouth every 6 hours, As needed, Moderate Pain. MDD:4 tablets. Stagger with the acetaminophen  -- Indication: For Anal fistula    Oxaydo 5 mg oral tablet  -- 1 tab(s) by mouth every 12 hours, As Needed for breakthrough pain. MDD:2 tablets   -- Indication: For Anal fistula    enalapril 5 mg oral tablet  -- 1 tab(s) by mouth once a day  -- Indication: For As indicated by PCP    Flomax 0.4 mg oral capsule  -- 1 cap(s) by mouth once a day   -- It is very important that you take or use this exactly as directed.  Do not skip doses or discontinue unless directed by your doctor.  May cause drowsiness.  Alcohol may intensify this effect.  Use care when operating dangerous machinery.  Some non-prescription drugs may aggravate your condition.  Read all labels carefully.  If a warning appears, check with your doctor before taking.  Swallow whole.  Do not crush.  Take with food or milk.    -- Indication: For As indicated by PCP    atorvastatin 40 mg oral tablet  -- 1 tab(s) by mouth once a day  -- Indication: For As indicated by PCP    Plavix  -- 75  orally  -- Indication: For As indicated by PCP    metoprolol succinate 25 mg oral tablet, extended release  -- 2 tab(s) by mouth once a day  -- Indication: For As indicated by PCP

## 2018-10-24 NOTE — ASU PREOP CHECKLIST - 1.
pt took ASA today in am; Plavix on 10/20/18---OR PATRICIA Odom made aware, will let Dr. Lagos know in OR

## 2018-11-07 ENCOUNTER — APPOINTMENT (OUTPATIENT)
Dept: COLORECTAL SURGERY | Facility: CLINIC | Age: 63
End: 2018-11-07
Payer: COMMERCIAL

## 2018-11-07 VITALS
WEIGHT: 179 LBS | DIASTOLIC BLOOD PRESSURE: 74 MMHG | HEIGHT: 68 IN | HEART RATE: 61 BPM | BODY MASS INDEX: 27.13 KG/M2 | TEMPERATURE: 97.8 F | SYSTOLIC BLOOD PRESSURE: 129 MMHG | OXYGEN SATURATION: 100 %

## 2018-11-07 DIAGNOSIS — K61.0 ANAL ABSCESS: ICD-10-CM

## 2018-11-07 PROCEDURE — 99024 POSTOP FOLLOW-UP VISIT: CPT

## 2018-12-12 ENCOUNTER — APPOINTMENT (OUTPATIENT)
Dept: COLORECTAL SURGERY | Facility: CLINIC | Age: 63
End: 2018-12-12
Payer: COMMERCIAL

## 2018-12-12 VITALS
HEIGHT: 68 IN | DIASTOLIC BLOOD PRESSURE: 78 MMHG | BODY MASS INDEX: 27.13 KG/M2 | WEIGHT: 179 LBS | SYSTOLIC BLOOD PRESSURE: 149 MMHG | TEMPERATURE: 97.8 F | HEART RATE: 64 BPM

## 2018-12-12 DIAGNOSIS — K60.3 ANAL FISTULA: ICD-10-CM

## 2018-12-12 PROCEDURE — 99024 POSTOP FOLLOW-UP VISIT: CPT

## 2021-01-11 NOTE — PATIENT PROFILE ADULT. - HAS THE PATIENT HAD A SIGNIFICANT CHANGE IN FUNCTIONAL STATUS DUE TO CVA, HEAD TRAUMA, ORTHOPEDIC TRAUMA/SURGERY, OR FALL, WITH THE WEEK PRIOR TO ADMISSION
In 3-5 days  Please follow up with your primary care doctor:  Linnea Dominguez MD   [address] [facility] / [facility] [address]   [phone]     Home, albuterol syrup 2.5 mL - 1/2 teaspoon-3 times daily as needed for cough, refer to your private physician for follow-up later this week or go to the emergency department for problems including new or worsening symptoms    Patient Education     Bronchitis with Wheezing (Child)    Bronchitis is inflammation and swelling of the lining of the lungs. This is often caused by an infection. Symptoms include a dry, hacking cough that is worse at night. The cough may bring up yellow-green mucus. Your child may also breathe fast or seem short of breath. He or she may have a fever. Other symptoms may include tiredness, chest discomfort, and chills. Inflammation may limit how much air can flow through the airways. This can cause wheezing and trouble breathing, even in children who don’t have asthma. Wheezing is a whistling sound caused by breathing through narrowed airways.  Bronchitis is most often caused by a virus of the upper respiratory tract. Symptoms can last up to 2 weeks, although the cough may last much longer. Medicines may be given to help relieve symptoms, including wheezing. Antibiotics will be prescribed only if your child’s health care provider thinks your child has a bacterial infection. Antibiotics do not cure a viral infection.  Home care  Follow these guidelines when caring for your child at home:  · Your child’s health care provider may prescribe medicine for cough, pain, or fever. You may be told to use saltwater (saline) nose drops to help with breathing. Use these before your child eats or sleeps. Your child may be prescribed bronchodilator medicine. This is to help with breathing. It may come as a spray, inhaler, or pill to take by mouth. Make sure your child uses the medicine exactly at the times advised. Follow all instructions for giving these medicines to  your child.  · The provider may also prescribe an oral antibiotic for your child. This is to help stop an infection. Follow all instructions for giving this medicine to your child. Make sure your child takes the medicine every day until it is gone. You should not have any left over.  · You may use over-the-counter medication as directed based on age and weight for fever or discomfort. (Note: If your child has chronic liver or kidney disease or has ever had a stomach ulcer or gastrointestinal bleeding, talk with your healthcare provider before using these medicines.) Aspirin should never be given to anyone younger than 18 years of age who is ill with a viral infection or fever. It may cause severe liver or brain damage. Don’t give your child any other medicine without first asking the healthcare provider.  · Don’t give a child under age 6 cough or cold medicine unless the provider tells you to do so. These have been shown to not help young children, and may cause serious side effects.  · Wash your hands well with soap and warm water before and after caring for your child. This is to help prevent spreading infection.  · Give your child plenty of time to rest. Trouble sleeping is common with this illness. Have your child sleep in a slightly upright position. This is to help make breathing easier. If possible, raise the head of the bed a few inches. Or prop your child’s body up with pillows.  · Make sure your older child blows his or her nose effectively. Your child’s healthcare provider may recommend saline nose drops to help thin and remove nasal secretions. Saline nose drops are available without a prescription. You can also use 1/4 teaspoon of table salt mixed well in 1 cup of water. You may put 2 to 3 drops of saline drops in each nostril before having your child blow his or her nose. Always wash your hands after touching used tissues.  · For younger children, suction mucus from the nose with saline nose drops and  a small bulb syringe. Talk with your child’s healthcare provider or pharmacist if you don’t know how to use a bulb syringe. Always wash your hands after using a bulb syringe or touching used tissues.  · To prevent dehydration and help loosen lung secretions in toddlers and older children, make sure your child drinks plenty of liquids. Children may prefer cold drinks, frozen desserts, or ice pops. They may also like warm soup or drinks with lemon and honey. Don’t give honey to a child younger than 1 year old.  · To prevent dehydration and help loosen lung secretions in infants under 1 year old, make sure your child drinks plenty of liquids. Use a medicine dropper, if needed, to give small amounts of breast milk, formula, or oral rehydration solution to your baby. Give 1 to 2 teaspoons every 10 to 15 minutes. A baby may only be able to feed for short amounts of time. If you are breastfeeding, pump and store milk for later use. Give your child oral rehydration solution between feedings. This is available from grocery stores and drugstores without a prescription.  · To make breathing easier during sleep, use a cool-mist humidifier in your child’s bedroom. Clean and dry the humidifier daily to prevent bacteria and mold growth. Don’t use a hot-water vaporizer. It can cause burns. Your child may also feel more comfortable sitting in a steamy bathroom for up to 10 minutes.  · Don’t expose your child to cigarette smoke. Tobacco smoke can make your child’s symptoms worse.  Follow-up care  Follow up with your child’s health care provider, or as advised.  When to seek medical advice  For a usually healthy child, call your child's healthcare provider right away if any of these occur:  · Your child is 3 months old or younger and has a fever of 100.4°F (38°C) or higher. Get medical care right away. Fever in a young baby can be a sign of a dangerous infection.  · Your child is of any age and has repeated fevers above 104°F  (40°C).  · Your child is younger than 2 years of age and a fever of 100.4°F (38°C) continues for more than 1 day.  · Your child is 2 years old or older and a fever of 100.4°F (38°C) continues for more than 3 days.  · Symptoms don’t get better in 1 to 2 weeks, or get worse.  · Breathing difficulty doesn’t get better in several days.  · Your child loses his or her appetite or feeds poorly.  · Your child shows signs of dehydration, such as dry mouth, crying with no tears, or urinating less than normal.  · The medicine doesn’t relieve wheezing.  Call 911  Call 911 if any of these occur:  · Increasing trouble breathing or increasing wheezing  · Extreme drowsiness or trouble awakening  · Confusion  · Fainting or loss of consciousness  Date Last Reviewed: 9/13/2015  © 7807-2253 [employer]. [address] [place] [address], [place] [address]. All rights reserved. This information is not intended as a substitute for professional medical care. Always follow your healthcare professional's instructions.            no
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
## 2021-09-23 NOTE — ASU DISCHARGE PLAN (ADULT/PEDIATRIC). - DISCHARGE DATE
24-Oct-2018 14:57 24-Oct-2018 16:15 Itraconazole Pregnancy And Lactation Text: This medication is Pregnancy Category C and it isn't know if it is safe during pregnancy. It is also excreted in breast milk.

## 2022-08-17 ENCOUNTER — OUTPATIENT (OUTPATIENT)
Dept: OUTPATIENT SERVICES | Facility: HOSPITAL | Age: 67
LOS: 1 days | End: 2022-08-17
Payer: COMMERCIAL

## 2022-08-17 VITALS
TEMPERATURE: 97 F | WEIGHT: 190.04 LBS | HEIGHT: 68 IN | RESPIRATION RATE: 14 BRPM | OXYGEN SATURATION: 100 % | SYSTOLIC BLOOD PRESSURE: 135 MMHG | DIASTOLIC BLOOD PRESSURE: 87 MMHG | HEART RATE: 62 BPM

## 2022-08-17 DIAGNOSIS — K61.2 ANORECTAL ABSCESS: Chronic | ICD-10-CM

## 2022-08-17 DIAGNOSIS — N40.1 BENIGN PROSTATIC HYPERPLASIA WITH LOWER URINARY TRACT SYMPTOMS: ICD-10-CM

## 2022-08-17 DIAGNOSIS — Z01.818 ENCOUNTER FOR OTHER PREPROCEDURAL EXAMINATION: ICD-10-CM

## 2022-08-17 DIAGNOSIS — Z98.890 OTHER SPECIFIED POSTPROCEDURAL STATES: Chronic | ICD-10-CM

## 2022-08-17 DIAGNOSIS — Z98.61 CORONARY ANGIOPLASTY STATUS: Chronic | ICD-10-CM

## 2022-08-17 LAB
ALBUMIN SERPL ELPH-MCNC: 3.9 G/DL — SIGNIFICANT CHANGE UP (ref 3.3–5)
ALP SERPL-CCNC: 46 U/L — SIGNIFICANT CHANGE UP (ref 40–120)
ALT FLD-CCNC: 37 U/L — SIGNIFICANT CHANGE UP (ref 12–78)
ANION GAP SERPL CALC-SCNC: 5 MMOL/L — SIGNIFICANT CHANGE UP (ref 5–17)
APPEARANCE UR: CLEAR — SIGNIFICANT CHANGE UP
AST SERPL-CCNC: 25 U/L — SIGNIFICANT CHANGE UP (ref 15–37)
BILIRUB SERPL-MCNC: 1 MG/DL — SIGNIFICANT CHANGE UP (ref 0.2–1.2)
BILIRUB UR-MCNC: NEGATIVE — SIGNIFICANT CHANGE UP
BUN SERPL-MCNC: 23 MG/DL — SIGNIFICANT CHANGE UP (ref 7–23)
CALCIUM SERPL-MCNC: 9.6 MG/DL — SIGNIFICANT CHANGE UP (ref 8.5–10.1)
CHLORIDE SERPL-SCNC: 106 MMOL/L — SIGNIFICANT CHANGE UP (ref 96–108)
CO2 SERPL-SCNC: 28 MMOL/L — SIGNIFICANT CHANGE UP (ref 22–31)
COLOR SPEC: YELLOW — SIGNIFICANT CHANGE UP
CREAT SERPL-MCNC: 1.2 MG/DL — SIGNIFICANT CHANGE UP (ref 0.5–1.3)
DIFF PNL FLD: NEGATIVE — SIGNIFICANT CHANGE UP
EGFR: 67 ML/MIN/1.73M2 — SIGNIFICANT CHANGE UP
GLUCOSE SERPL-MCNC: 98 MG/DL — SIGNIFICANT CHANGE UP (ref 70–99)
GLUCOSE UR QL: NEGATIVE — SIGNIFICANT CHANGE UP
HCT VFR BLD CALC: 40.1 % — SIGNIFICANT CHANGE UP (ref 39–50)
HGB BLD-MCNC: 13.9 G/DL — SIGNIFICANT CHANGE UP (ref 13–17)
KETONES UR-MCNC: NEGATIVE — SIGNIFICANT CHANGE UP
LEUKOCYTE ESTERASE UR-ACNC: NEGATIVE — SIGNIFICANT CHANGE UP
MCHC RBC-ENTMCNC: 30.3 PG — SIGNIFICANT CHANGE UP (ref 27–34)
MCHC RBC-ENTMCNC: 34.7 GM/DL — SIGNIFICANT CHANGE UP (ref 32–36)
MCV RBC AUTO: 87.6 FL — SIGNIFICANT CHANGE UP (ref 80–100)
NITRITE UR-MCNC: NEGATIVE — SIGNIFICANT CHANGE UP
NRBC # BLD: 0 /100 WBCS — SIGNIFICANT CHANGE UP (ref 0–0)
PH UR: 5 — SIGNIFICANT CHANGE UP (ref 5–8)
PLATELET # BLD AUTO: 186 K/UL — SIGNIFICANT CHANGE UP (ref 150–400)
POTASSIUM SERPL-MCNC: 4 MMOL/L — SIGNIFICANT CHANGE UP (ref 3.5–5.3)
POTASSIUM SERPL-SCNC: 4 MMOL/L — SIGNIFICANT CHANGE UP (ref 3.5–5.3)
PROT SERPL-MCNC: 8.2 G/DL — SIGNIFICANT CHANGE UP (ref 6–8.3)
PROT UR-MCNC: NEGATIVE — SIGNIFICANT CHANGE UP
RBC # BLD: 4.58 M/UL — SIGNIFICANT CHANGE UP (ref 4.2–5.8)
RBC # FLD: 12.5 % — SIGNIFICANT CHANGE UP (ref 10.3–14.5)
SODIUM SERPL-SCNC: 139 MMOL/L — SIGNIFICANT CHANGE UP (ref 135–145)
SP GR SPEC: 1.01 — SIGNIFICANT CHANGE UP (ref 1.01–1.02)
UROBILINOGEN FLD QL: NEGATIVE — SIGNIFICANT CHANGE UP
WBC # BLD: 4.2 K/UL — SIGNIFICANT CHANGE UP (ref 3.8–10.5)
WBC # FLD AUTO: 4.2 K/UL — SIGNIFICANT CHANGE UP (ref 3.8–10.5)

## 2022-08-17 PROCEDURE — 80053 COMPREHEN METABOLIC PANEL: CPT

## 2022-08-17 PROCEDURE — 87086 URINE CULTURE/COLONY COUNT: CPT

## 2022-08-17 PROCEDURE — G0463: CPT

## 2022-08-17 PROCEDURE — 93010 ELECTROCARDIOGRAM REPORT: CPT

## 2022-08-17 PROCEDURE — 81003 URINALYSIS AUTO W/O SCOPE: CPT

## 2022-08-17 PROCEDURE — 93005 ELECTROCARDIOGRAM TRACING: CPT

## 2022-08-17 PROCEDURE — 85027 COMPLETE CBC AUTOMATED: CPT

## 2022-08-17 PROCEDURE — 36415 COLL VENOUS BLD VENIPUNCTURE: CPT

## 2022-08-17 RX ORDER — METOPROLOL TARTRATE 50 MG
2 TABLET ORAL
Qty: 0 | Refills: 0 | DISCHARGE

## 2022-08-17 RX ORDER — ASPIRIN/CALCIUM CARB/MAGNESIUM 324 MG
1 TABLET ORAL
Qty: 0 | Refills: 0 | DISCHARGE

## 2022-08-17 RX ORDER — CLOPIDOGREL BISULFATE 75 MG/1
75 TABLET, FILM COATED ORAL
Qty: 0 | Refills: 0 | DISCHARGE

## 2022-08-17 NOTE — H&P PST ADULT - NSICDXPASTSURGICALHX_GEN_ALL_CORE_FT
PAST SURGICAL HISTORY:  Abscess of anal or rectal region Repair 2019    H/O colonoscopy     H/O coronary angioplasty x 8 stents

## 2022-08-17 NOTE — H&P PST ADULT - GENERAL COMMENTS
Denies any travel in the last 2 weeks - Denies any recent exposure to anyone with known or suspected covid - denies any current covid symptoms

## 2022-08-17 NOTE — H&P PST ADULT - HISTORY OF PRESENT ILLNESS
66 year old male PMH MI, HTN, Hypercholesterolemia, Irregular Heart Rhythm, Cardiac Stents, BPH with incomplete bladder emptying, Elevated PSA, Urgency Incontinence, Urinary frequency, Arteriopathic Impotence; COVID-19 March 2020; Enlarged Prostate with Lower Urinary tract Symptoms; presents today for PST prior to Greenlight laser Prostate with Dr Holland Rosenthal on 8/26/2022.     Pt notes prior H/O Enlarged prostate for about 20 years hand he notes the last 2 years he notes it has gotten more difficult to urinate, also notes intermittent leakage. Pt notes he changed Urologist and is now seeing  Dr Rosenthal. He does note that symptoms have been a bit decreased over the last few months however following consult, exam and discussions regarding treatment options with Dr Rosenthal pt is electing for scheduled procedure.

## 2022-08-17 NOTE — H&P PST ADULT - PATIENT ON (OXYGEN DELIVERY METHOD)
11.5   6.61  )-----------( 182      ( 01 May 2020 03:05 )             33.7       05-01    131<L>  |  96  |  12  ----------------------------<  115<H>  4.0   |  28  |  0.74    Ca    9.2      01 May 2020 03:05    TPro  7.3  /  Alb  3.5  /  TBili  0.4  /  DBili  x   /  AST  30  /  ALT  15  /  AlkPhos  63  05-01         LIVER FUNCTIONS - ( 01 May 2020 03:05 )  Alb: 3.5 g/dL / Pro: 7.3 g/dL / ALK PHOS: 63 U/L / ALT: 15 U/L / AST: 30 U/L / GGT: x               EK AVB, 92bpm, RBBB, LAD      CXR: wet read NAPD    L wrist X-ray: base of 5th MCP fx?  Hip Xray: distal femur periprosthetic fx.
room air

## 2022-08-17 NOTE — H&P PST ADULT - NSICDXPASTMEDICALHX_GEN_ALL_CORE_FT
PAST MEDICAL HISTORY:  2019 novel coronavirus disease (COVID-19) March 2020    Arthritis LEFT Knee    BPH (benign prostatic hyperplasia)     Enlarged prostate     Enlarged prostate with lower urinary tract symptoms (LUTS)     History of atrial fibrillation DX after one episode May 2022 was started on Sotalol has not had any issues since    History of rectal abscess     HTN (hypertension)     Hypercholesterolemia     MI (Myocardial Infarction) 1996 (Or 1998)    Stented Coronary Artery x 8 stents

## 2022-08-17 NOTE — H&P PST ADULT - PROBLEM SELECTOR PLAN 1
PST Labs; CBC, CMP, U/A, Urine Culture, EKG. Medical and cardiac clearance needed. pt instructed to stop any NSAIDS/Herbal Supplements between now and procedure. May take Tylenol if needed for any pain between now and procedure. PST Labs; CBC, CMP, U/A, Urine Culture, EKG. Medical and cardiac clearance needed. pt instructed to stop any NSAIDS/Herbal Supplements between now and procedure. May take Tylenol if needed for any pain between now and procedure. He was instructed to stop his CLopidrogel, Omega-3 and CoQ 10 one week prior to procedure. He will however REMAIN on his Baby ASA after dinner as per usual routine secondary to cardiac stents. Morning of procedure he may take his Metoprolol and Sotalol with small sip of water. Pre-op instructions given to pt with understanding verbalized. Appointment made for pt to have COVID swab done at [facility] Monday 8/22/22 @ 3:30PM. RX for COVID swab given to pt. pt verbalizes understanding of all instructions discussed today in PST. All questions addressed with pt prior to him leaving the PST department today.

## 2022-08-17 NOTE — H&P PST ADULT - NSICDXFAMILYHX_GEN_ALL_CORE_FT
FAMILY HISTORY:  Father  Still living? No  Family history of aneurysm, Age at diagnosis: Age Unknown  Family history of heart disease, Age at diagnosis: Age Unknown    Mother  Still living? No  Family history of heart disease, Age at diagnosis: Age Unknown  Family history of lung cancer, Age at diagnosis: Age Unknown

## 2022-08-17 NOTE — H&P PST ADULT - FALL HARM RISK - UNIVERSAL INTERVENTIONS
Bed in lowest position, wheels locked, appropriate side rails in place/Call bell, personal items and telephone in reach/Instruct patient to call for assistance before getting out of bed or chair/Non-slip footwear when patient is out of bed/[place] to call system/Physically safe environment - no spills, clutter or unnecessary equipment/Purposeful Proactive Rounding/Room/bathroom lighting operational, light cord in reach

## 2022-08-17 NOTE — H&P PST ADULT - ASSESSMENT
66 year old male PMH MI, HTN, Hypercholesterolemia, Irregular Heart Rhythm, Cardiac Stents, BPH with incomplete bladder emptying, Elevated PSA, Urgency Incontinence, Urinary frequency, Arteriopathic Impotence; COVID-19 March 2020; Enlarged Prostate with Lower Urinary tract Symptoms; scheduled for  Greenlight laser Prostate with Dr Holland Rosenthal on 8/26/2022.

## 2022-08-18 LAB
CULTURE RESULTS: SIGNIFICANT CHANGE UP
SPECIMEN SOURCE: SIGNIFICANT CHANGE UP

## 2022-08-25 ENCOUNTER — TRANSCRIPTION ENCOUNTER (OUTPATIENT)
Age: 67
End: 2022-08-25

## 2022-08-26 ENCOUNTER — OUTPATIENT (OUTPATIENT)
Dept: OUTPATIENT SERVICES | Facility: HOSPITAL | Age: 67
LOS: 1 days | End: 2022-08-26
Payer: COMMERCIAL

## 2022-08-26 ENCOUNTER — EMERGENCY (EMERGENCY)
Facility: HOSPITAL | Age: 67
LOS: 1 days | Discharge: HOME | End: 2022-08-26
Attending: EMERGENCY MEDICINE
Payer: COMMERCIAL

## 2022-08-26 ENCOUNTER — TRANSCRIPTION ENCOUNTER (OUTPATIENT)
Age: 67
End: 2022-08-26

## 2022-08-26 VITALS
TEMPERATURE: 97 F | SYSTOLIC BLOOD PRESSURE: 158 MMHG | OXYGEN SATURATION: 100 % | DIASTOLIC BLOOD PRESSURE: 78 MMHG | RESPIRATION RATE: 20 BRPM | WEIGHT: 190.04 LBS | HEIGHT: 68 IN | HEART RATE: 61 BPM

## 2022-08-26 VITALS
RESPIRATION RATE: 16 BRPM | WEIGHT: 188.05 LBS | OXYGEN SATURATION: 98 % | HEART RATE: 83 BPM | HEIGHT: 68 IN | DIASTOLIC BLOOD PRESSURE: 67 MMHG | SYSTOLIC BLOOD PRESSURE: 122 MMHG | TEMPERATURE: 99 F

## 2022-08-26 VITALS
HEART RATE: 60 BPM | OXYGEN SATURATION: 100 % | DIASTOLIC BLOOD PRESSURE: 82 MMHG | SYSTOLIC BLOOD PRESSURE: 141 MMHG | RESPIRATION RATE: 13 BRPM

## 2022-08-26 VITALS
DIASTOLIC BLOOD PRESSURE: 76 MMHG | HEART RATE: 72 BPM | SYSTOLIC BLOOD PRESSURE: 146 MMHG | OXYGEN SATURATION: 100 % | TEMPERATURE: 98 F | RESPIRATION RATE: 18 BRPM

## 2022-08-26 DIAGNOSIS — Z98.61 CORONARY ANGIOPLASTY STATUS: Chronic | ICD-10-CM

## 2022-08-26 DIAGNOSIS — K61.2 ANORECTAL ABSCESS: Chronic | ICD-10-CM

## 2022-08-26 DIAGNOSIS — Z98.890 OTHER SPECIFIED POSTPROCEDURAL STATES: Chronic | ICD-10-CM

## 2022-08-26 DIAGNOSIS — N40.1 BENIGN PROSTATIC HYPERPLASIA WITH LOWER URINARY TRACT SYMPTOMS: ICD-10-CM

## 2022-08-26 PROCEDURE — 99282 EMERGENCY DEPT VISIT SF MDM: CPT

## 2022-08-26 PROCEDURE — 52648 LASER SURGERY OF PROSTATE: CPT

## 2022-08-26 PROCEDURE — 99284 EMERGENCY DEPT VISIT MOD MDM: CPT

## 2022-08-26 PROCEDURE — C1889: CPT

## 2022-08-26 DEVICE — FIBER MOXY REG: Type: IMPLANTABLE DEVICE | Status: FUNCTIONAL

## 2022-08-26 RX ORDER — CEFTRIAXONE 500 MG/1
1000 INJECTION, POWDER, FOR SOLUTION INTRAMUSCULAR; INTRAVENOUS ONCE
Refills: 0 | Status: COMPLETED | OUTPATIENT
Start: 2022-08-26 | End: 2022-08-26

## 2022-08-26 RX ORDER — UBIDECARENONE 100 MG
1 CAPSULE ORAL
Qty: 0 | Refills: 0 | DISCHARGE

## 2022-08-26 RX ORDER — ASPIRIN/CALCIUM CARB/MAGNESIUM 324 MG
1 TABLET ORAL
Qty: 0 | Refills: 0 | DISCHARGE

## 2022-08-26 RX ORDER — CLOPIDOGREL BISULFATE 75 MG/1
1 TABLET, FILM COATED ORAL
Qty: 0 | Refills: 0 | DISCHARGE

## 2022-08-26 RX ORDER — ONDANSETRON 8 MG/1
4 TABLET, FILM COATED ORAL ONCE
Refills: 0 | Status: DISCONTINUED | OUTPATIENT
Start: 2022-08-26 | End: 2022-08-26

## 2022-08-26 RX ORDER — ATORVASTATIN CALCIUM 80 MG/1
1 TABLET, FILM COATED ORAL
Qty: 0 | Refills: 0 | DISCHARGE

## 2022-08-26 RX ORDER — HYDROMORPHONE HYDROCHLORIDE 2 MG/ML
0.5 INJECTION INTRAMUSCULAR; INTRAVENOUS; SUBCUTANEOUS ONCE
Refills: 0 | Status: DISCONTINUED | OUTPATIENT
Start: 2022-08-26 | End: 2022-08-26

## 2022-08-26 RX ORDER — DUTASTERIDE 0.5 MG/1
1 CAPSULE, LIQUID FILLED ORAL
Qty: 0 | Refills: 0 | DISCHARGE

## 2022-08-26 RX ORDER — METOPROLOL TARTRATE 50 MG
1 TABLET ORAL
Qty: 0 | Refills: 0 | DISCHARGE

## 2022-08-26 RX ORDER — TAMSULOSIN HYDROCHLORIDE 0.4 MG/1
1 CAPSULE ORAL
Qty: 0 | Refills: 0 | DISCHARGE

## 2022-08-26 RX ORDER — PHENAZOPYRIDINE HCL 100 MG
1 TABLET ORAL
Qty: 30 | Refills: 0
Start: 2022-08-26 | End: 2022-09-04

## 2022-08-26 RX ORDER — SODIUM CHLORIDE 9 MG/ML
1000 INJECTION, SOLUTION INTRAVENOUS
Refills: 0 | Status: DISCONTINUED | OUTPATIENT
Start: 2022-08-26 | End: 2022-08-26

## 2022-08-26 RX ORDER — SOTALOL HCL 120 MG
0.5 TABLET ORAL
Qty: 0 | Refills: 0 | DISCHARGE

## 2022-08-26 RX ORDER — OMEGA-3 ACID ETHYL ESTERS 1 G
3 CAPSULE ORAL
Qty: 0 | Refills: 0 | DISCHARGE

## 2022-08-26 RX ORDER — SODIUM CHLORIDE 9 MG/ML
1000 INJECTION INTRAMUSCULAR; INTRAVENOUS; SUBCUTANEOUS
Refills: 0 | Status: DISCONTINUED | OUTPATIENT
Start: 2022-08-26 | End: 2022-08-26

## 2022-08-26 RX ORDER — HYDROMORPHONE HYDROCHLORIDE 2 MG/ML
1 INJECTION INTRAMUSCULAR; INTRAVENOUS; SUBCUTANEOUS ONCE
Refills: 0 | Status: DISCONTINUED | OUTPATIENT
Start: 2022-08-26 | End: 2022-08-26

## 2022-08-26 RX ORDER — CEFUROXIME AXETIL 250 MG
1 TABLET ORAL
Qty: 10 | Refills: 0
Start: 2022-08-26 | End: 2022-08-30

## 2022-08-26 RX ADMIN — SODIUM CHLORIDE 75 MILLILITER(S): 9 INJECTION INTRAMUSCULAR; INTRAVENOUS; SUBCUTANEOUS at 08:55

## 2022-08-26 NOTE — ED PROVIDER NOTE - GENITOURINARY, MLM
No discharge, lesions.  Lee catheter in place with dark urine in Lee bag; evidence of slight urine leaking from urethral meatus around the catheter

## 2022-08-26 NOTE — ED PROVIDER NOTE - PATIENT PORTAL LINK FT
You can access the FollowMyHealth Patient Portal offered by [facility] by registering at the following website: [email or web address]. By joining [facility]’s FollowMyHealth portal, you will also be able to view your health information using other applications (apps) compatible with our system.

## 2022-08-26 NOTE — ED ADULT NURSE NOTE - CHIEF COMPLAINT QUOTE
BIBA for no urine output in urine catheter, pt has h/o enlarged prostate and had a prostate procedure done in [facility] today, pt was instructed by his Urologist to go to nearest ER to have urine cath checked out

## 2022-08-26 NOTE — ED PROVIDER NOTE - NSFOLLOWUPINSTRUCTIONS_ED_ALL_ED_FT
VILLAR CATHETER PLACEMENT AND CARE - AfterCare(R) Instructions(ER/ED)           Villar Catheter Placement and Care    WHAT YOU NEED TO KNOW:    A Villar catheter is a sterile tube that is inserted into your bladder to drain urine. It is also called an indwelling urinary catheter.     DISCHARGE INSTRUCTIONS:    Return to the emergency department if:   •Your catheter comes out.       •You suddenly have material that looks like sand in the tubing or drainage bag.      •No urine is draining into the bag and you have checked the system.      •You have pain in your hip, back, pelvis, or lower abdomen.      •You are confused or cannot think clearly.      Call your doctor or urologist if:   •You have a fever.      •You have bladder spasms for more than 1 day after the catheter is placed.      •You see blood in the tubing or drainage bag.      •You have a rash or itching where the catheter tube is secured to your skin.      •Urine leaks from or around the catheter, tubing, or drainage bag.      •The closed drainage system has accidently come open or apart.       •You see a layer of crystals inside the tubing.      •You have questions or concerns about your condition or care.      Care for your catheter and drainage bag: You can reduce your risk for infection and injury by caring for your catheter and drainage bag properly.  •Wash your hands often. Wash before and after you touch your catheter, tubing, or drainage bag. Use soap and water. Wear clean disposable gloves when you care for your catheter or disconnect the drainage bag. Wash your hands before you prepare or eat food.   Handwashing           •Clean your genital area 2 times every day. Clean your catheter area and anal opening after every bowel movement. ?For men: Use a soapy cloth to clean the tip of your penis. Start where the catheter enters. Wipe backward making sure to pull back the foreskin. Then use a cloth with clear water in the same direction to clean away the soap.       ?For women: Use a soapy cloth to clean the area that the catheter enters your body. Make sure to separate your labia and wipe toward the anus. Then use a cloth with clear water and wipe in the same direction.       •Secure the catheter tube so you do not pull or move the catheter. This helps prevent pain and bladder spasms. Healthcare providers will show you how to use medical tape or a strap to secure the catheter tube to your body.       •Keep a closed drainage system. Your catheter should always be attached to the drainage bag to form a closed system. Do not disconnect any part of the closed system unless you need to change the bag.      •Keep the drainage bag below the level of your waist. This helps stop urine from moving back up the tubing and into your bladder. Do not loop or kink the tubing. This can cause urine to back up and collect in your bladder. Do not let the drainage bag touch or lie on the floor.      •Empty the drainage bag when needed. The weight of a full drainage bag can be painful. Empty the drainage bag every 3 to 6 hours or when it is ? full.       •Clean and change the drainage bag as directed. Ask your healthcare provider how often you should change the drainage bag and what cleaning solution to use. Wear disposable gloves when you change the bag. Do not allow the end of the catheter or tubing to touch anything. Clean the ends with an alcohol pad before you reconnect them.      What to do if problems develop:   •No urine is draining into the bag: ?Check for kinks in the tubing and straighten them out.       ?Check the tape or strap used to secure the catheter tube to your skin. Make sure it is not blocking the tube.       ?Make sure you are not sitting or lying on the tubing.      ?Make sure the urine bag is hanging below the level of your waist.      •Urine leaks from or around the catheter, tubing, or drainage bag: Check if the closed drainage system has accidently come open or apart. Clean the catheter and tubing ends with a new alcohol pad and reconnect them.       Follow up with your doctor or urologist as directed: Write down your questions so you remember to ask them during your visits.        © Copyright [employer] 2022           back to top                          © Copyright [employer] 2022

## 2022-08-26 NOTE — BRIEF OPERATIVE NOTE - NSICDXBRIEFPROCEDURE_GEN_ALL_CORE_FT
PROCEDURES:  Photoselective vaporization of prostate (PVP) with GreenLight laser 26-Aug-2022 11:56:44  Holland Rosenthal

## 2022-08-26 NOTE — ED PROVIDER NOTE - OBJECTIVE STATEMENT
67 y/o man, h/o BPH, A-fib, CAD with stents, HTN, s/p green light prostate procedure today with Dr. Rosenthal under anesthesia at [facility], went home with Lee catheter to leg bag and says there was no output in the leg bag so he d/w on-call urologist, Dr. Roberts, and was advised to go to the nearest ED.  No fever/chills/nausea/vomiting/abdominal pain.

## 2022-08-26 NOTE — ASU DISCHARGE PLAN (ADULT/PEDIATRIC) - NS MD DC FALL RISK RISK
For information on Fall & Injury Prevention, visit: [email or web address] OR  [email or web address] OR  https://www.cdc.gov/steadi/patient.html

## 2022-08-26 NOTE — ED ADULT TRIAGE NOTE - SPO2 (%)
You have an abnormal lesion on your adrenal gland  In order to test this for production of abnormal hormones, you will be undergoing morning blood work  The night prior to the blood work, at 11:00 p m , you must take the dexamethasone pill prescribed  You must proceed for early morning blood work the following morning 
98

## 2022-08-26 NOTE — ED PROVIDER NOTE - CLINICAL SUMMARY MEDICAL DECISION MAKING FREE TEXT BOX
67 y/o man, h/o BPH, A-fib, CAD with stents, HTN, s/p green light prostate procedure today with Dr. Rosenthal under anesthesia at [facility], went home with Lee catheter to leg bag and says there was no output in the leg bag so he d/w on-call urologist--upon arrival, Lee was flushed by RN and subsequently put out 1000 mL dark-colored urine.  Will attempt to reach Pt's urologist to discuss.

## 2022-08-26 NOTE — ED PROVIDER NOTE - PROGRESS NOTE DETAILS
D/w Dr. Roberts, urologist on-call for the urologist that performed the procedure.  He says that since there is appropriate drainage from the Lee now he would recommend leaving it alone and not changing it and then D/C and f/u with urology as per routing recommendations.  Pt informed.

## 2022-08-26 NOTE — ED ADULT NURSE NOTE - OBJECTIVE STATEMENT
Pt presented to the ED with c/o no urine on Lee catheter s/p bladder procedure. presented with leg bag in place. Denies any c/o pain.

## 2022-08-26 NOTE — ED ADULT TRIAGE NOTE - CHIEF COMPLAINT QUOTE
BIBA for no urine output in urine catheter, pt has h/o enlarged prostate and had a prostate procedure done in [facility] today, pt was instructed by his Urologist to go to nearest ER to have urine cath checked out

## 2022-08-26 NOTE — ASU DISCHARGE PLAN (ADULT/PEDIATRIC) - CARE PROVIDER_API CALL
Holland Rosenthal)  Urology  [address] [facility], [address]  [place] [address]  Phone: [phone]  Fax: [phone]  Follow Up Time:

## 2022-08-26 NOTE — ED PROVIDER NOTE - CPE EDP ENMT NORM
EXAM: CT Abdomen and Pelvis with IV contrast

 

CLINICAL HISTORY: Upper abdominal pain 

 

COMPARISON: none

 

TECHNIQUE: Helical CT of the abdomen and pelvis was performed following 

the administration of intravenous contrast. Oral contrast was 

administered. Axial, coronal and sagittal reformatted images were 

generated.

 

PQRS compliance statement - One or more of the following individualized 

dose reduction techniques were utilized for this study:

1.  Automated exposure control

2.  Adjustment of the mA and/or kV according to patient size

3.  Use of iterative reconstruction technique

 

FINDINGS:

Lower chest: 

Subpleural linear opacities likely scarring/atelectasis.4 mm right lower 

lobe lung nodules are seen.

 

Abdomen and Pelvis: 

Diffuse hepatic hypoattenuation, hepatic steatosis. No focal liver lesion.

Liver is mildly enlarged measuring 18.7 cm in length. Accounting for 

postcholecystectomy change, no biliary ductal dilatation.

 

Spleen is unremarkable. Adrenal glands are normal. Pancreas is 

unremarkable. 

 

Symmetric nephrograms. No focal renal lesion. No hydronephrosis. 

 

Appendix is normal. No small or large bowel dilatation. No evidence for 

bowel obstruction. Mild colonic stool content is seen.

 

Partially distended bladder is unremarkable.

 

Small fat-containing periumbilical hernia is seen. No abdominal or pelvic 

ascites. No abdominal or pelvic lymphadenopathy.

 

Bones: 

Osseous structures are grossly unremarkable.

 

IMPRESSION: 

Mild hepatomegaly and hepatic steatosis

Accounting for postcholecystectomy change, no biliary ductal dilatation.

No evidence of bowel obstruction. Appendix is normal.

 

Electronically signed by: Bharath Wren MD (2/28/2019 5:48 AM) [facility] normal...

## 2022-08-27 PROBLEM — U07.1 COVID-19: Chronic | Status: ACTIVE | Noted: 2022-08-17

## 2022-08-27 PROBLEM — M19.90 UNSPECIFIED OSTEOARTHRITIS, UNSPECIFIED SITE: Chronic | Status: ACTIVE | Noted: 2022-08-17

## 2022-08-27 PROBLEM — E78.00 PURE HYPERCHOLESTEROLEMIA, UNSPECIFIED: Chronic | Status: ACTIVE | Noted: 2022-08-17

## 2022-08-27 PROBLEM — N40.1 BENIGN PROSTATIC HYPERPLASIA WITH LOWER URINARY TRACT SYMPTOMS: Chronic | Status: ACTIVE | Noted: 2022-08-17

## 2022-08-27 PROBLEM — Z87.19 PERSONAL HISTORY OF OTHER DISEASES OF THE DIGESTIVE SYSTEM: Chronic | Status: ACTIVE | Noted: 2022-08-17

## 2022-08-27 PROBLEM — Z86.79 PERSONAL HISTORY OF OTHER DISEASES OF THE CIRCULATORY SYSTEM: Chronic | Status: ACTIVE | Noted: 2022-08-17

## 2022-08-27 NOTE — ED ADULT NURSE REASSESSMENT NOTE - NS ED NURSE REASSESS COMMENT FT1
Red bloody urine noted in Lee back. Lee irrigated, dark red urine with clots drained. Pt denies any discomfort or pain. Drainage bag empty, 900ml of dark red colored urine. MD. made aware.

## 2023-09-27 NOTE — ED PROVIDER NOTE - ENMT, MLM
Please sign attached encounter and note. 
Airway patent, Nasal mucosa clear. Mouth with normal mucosa. Throat has no vesicles, no oropharyngeal exudates and uvula is midline.

## 2024-01-06 NOTE — ED ADULT NURSE NOTE - COVID-19 RESULT
PICC Team Note    Procedure: Insertion of Double Lumen Bard Solo PICC Line    Indications: Miodrine    Procedure Details:  Order for PICC line received and acknowledged, labs and diagnostics were also reviewed.  Risks and benefits were discussed with patient/family including use of local anesthetic, risks of thrombosis, bleeding, infection, tissue damage, and possible arterial puncture.  Informed consent was obtained for placement of PICC line.  Stop sign was placed on patient door prior to procedure.  There was/were one additional person(linda) present during procedure who also donned gloves, hat, and mask after performing hand hygiene.      #4 FR Double Lumen Bard Solo Power PICC Line inserted in the Right Brachial vein with one poke(s) using maximum sterile technique including chloraprep, cap, gown, gloves, hand hygiene, mask and drape per hospital protocol. With ultrasound guidance lidocaine 1% was used to anesthetize the site and a 21 gauge micropuncture needle was used to gain access into the vessel. A guidewire was inserted to secure vascular access. An introducer was then placed over the guidewire. The guidewire was removed and was noted to be intact.  Using this access the PICC line was cut and inserted at 38 cm with 0 cm exposed using 3CG technology. Per [facility] measurement PICC line occupies 37 % of vessel.  PICC line was secured using a Stat-lock device, biopatch placed over insertion site followed by a sterile dressing.  Mid upper arm circumference is 36 cm.  Brisk blood return noted to all port(s) and catheter was flushed and locked with 20 cc NS. Dual cap(s) were applied to each port. Bed was returned to low position prior to leaving patient room. Patient tolerated procedure well.    Please Note:  This is cut to length PICC line and may appear irregular when removed.   This is a Power PICC that may used for contrast injections.    LOT #:  [id number]     Recommendations:  PICC line placement confirmed  with ECG technology and maximum P waves noted.  Bedside RN notified and line is ready to use, and to change IV tubing prior to using PICC line.  Bard PICC Brochure and infection prevention sheet was given to patient.    Inocencio Hunt RN  [facility]  VAT/PICC Team  [phone]   NEGATIVE

## 2025-01-15 NOTE — ASU PREOP CHECKLIST - VERIFY SURGICAL SITE/SIDE WITH PATIENT
[facility] will admit today/ [facility] will transport at 6PM. Orders faxed. Nurse to call report to [phone]. Discussed with patient and sister. They are agreeable with dc plans. IMM letter provided to patient.  Patient offered four hours to make informed decision regarding appeal process; patient agreeable to discharge.     done

## (undated) DEVICE — DRAPE TOWEL BLUE 17" X 24"

## (undated) DEVICE — FOLEY HOLDER STATLOCK 2 WAY ADULT

## (undated) DEVICE — Device

## (undated) DEVICE — FLOORMAT SURGISAFE ABSORBANT WHITE 36" X 72"

## (undated) DEVICE — DRAPE DRAINAGE BAG URO CATCH II

## (undated) DEVICE — PACK CYSTO

## (undated) DEVICE — WARMING BLANKET UPPER ADULT

## (undated) DEVICE — DRAINAGE BAG URINARY 2L

## (undated) DEVICE — VENODYNE/SCD SLEEVE CALF MEDIUM

## (undated) DEVICE — GLV 7.5 PROTEXIS (WHITE)

## (undated) DEVICE — SYR LUER LOK 10CC

## (undated) DEVICE — VENODYNE/SCD SLEEVE CALF LARGE

## (undated) DEVICE — PLV-SCD MACHINE: Type: DURABLE MEDICAL EQUIPMENT

## (undated) DEVICE — SOL INJ NS 0.9% 1000ML

## (undated) DEVICE — SOL IRR POUR H2O 1000ML

## (undated) DEVICE — TUBING TUR 4 PRONG

## (undated) DEVICE — SOL IRR BAG NS 0.9% 3000ML